# Patient Record
Sex: FEMALE | Race: WHITE | NOT HISPANIC OR LATINO | Employment: FULL TIME | URBAN - NONMETROPOLITAN AREA
[De-identification: names, ages, dates, MRNs, and addresses within clinical notes are randomized per-mention and may not be internally consistent; named-entity substitution may affect disease eponyms.]

---

## 2020-06-23 ENCOUNTER — OFFICE VISIT CONVERTED (OUTPATIENT)
Dept: FAMILY MEDICINE CLINIC | Age: 35
End: 2020-06-23
Attending: NURSE PRACTITIONER

## 2020-08-28 ENCOUNTER — CONVERSION ENCOUNTER (OUTPATIENT)
Dept: PLASTIC SURGERY | Facility: CLINIC | Age: 35
End: 2020-08-28

## 2020-08-28 ENCOUNTER — OFFICE VISIT CONVERTED (OUTPATIENT)
Dept: PLASTIC SURGERY | Facility: CLINIC | Age: 35
End: 2020-08-28
Attending: PLASTIC SURGERY

## 2020-09-21 ENCOUNTER — HOSPITAL ENCOUNTER (OUTPATIENT)
Dept: OTHER | Facility: HOSPITAL | Age: 35
Discharge: HOME | End: 2020-09-21
Attending: PLASTIC SURGERY

## 2020-10-23 ENCOUNTER — OFFICE VISIT CONVERTED (OUTPATIENT)
Dept: PLASTIC SURGERY | Facility: CLINIC | Age: 35
End: 2020-10-23
Attending: PLASTIC SURGERY

## 2020-10-23 ENCOUNTER — CONVERSION ENCOUNTER (OUTPATIENT)
Dept: PLASTIC SURGERY | Facility: CLINIC | Age: 35
End: 2020-10-23

## 2020-10-29 ENCOUNTER — HOSPITAL ENCOUNTER (OUTPATIENT)
Dept: PREADMISSION TESTING | Facility: HOSPITAL | Age: 35
Discharge: HOME | End: 2020-10-29
Attending: PLASTIC SURGERY

## 2020-10-31 LAB — SARS-COV-2 RNA SPEC QL NAA+PROBE: NOT DETECTED

## 2020-11-03 ENCOUNTER — PROCEDURE VISIT CONVERTED (OUTPATIENT)
Dept: OTHER | Facility: HOSPITAL | Age: 35
End: 2020-11-03
Attending: PLASTIC SURGERY

## 2020-11-03 ENCOUNTER — HOSPITAL ENCOUNTER (OUTPATIENT)
Dept: PERIOP | Facility: HOSPITAL | Age: 35
Setting detail: HOSPITAL OUTPATIENT SURGERY
Discharge: HOME | End: 2020-11-03
Attending: PLASTIC SURGERY

## 2020-11-06 ENCOUNTER — OFFICE VISIT CONVERTED (OUTPATIENT)
Dept: PLASTIC SURGERY | Facility: CLINIC | Age: 35
End: 2020-11-06
Attending: PLASTIC SURGERY

## 2020-11-06 ENCOUNTER — CONVERSION ENCOUNTER (OUTPATIENT)
Dept: PLASTIC SURGERY | Facility: CLINIC | Age: 35
End: 2020-11-06

## 2020-11-13 ENCOUNTER — OFFICE VISIT CONVERTED (OUTPATIENT)
Dept: PLASTIC SURGERY | Facility: CLINIC | Age: 35
End: 2020-11-13
Attending: PLASTIC SURGERY

## 2020-11-13 ENCOUNTER — CONVERSION ENCOUNTER (OUTPATIENT)
Dept: PLASTIC SURGERY | Facility: CLINIC | Age: 35
End: 2020-11-13

## 2020-11-30 ENCOUNTER — OFFICE VISIT CONVERTED (OUTPATIENT)
Dept: PLASTIC SURGERY | Facility: CLINIC | Age: 35
End: 2020-11-30
Attending: NURSE PRACTITIONER

## 2020-11-30 ENCOUNTER — CONVERSION ENCOUNTER (OUTPATIENT)
Dept: PLASTIC SURGERY | Facility: CLINIC | Age: 35
End: 2020-11-30

## 2020-12-18 ENCOUNTER — OFFICE VISIT CONVERTED (OUTPATIENT)
Dept: FAMILY MEDICINE CLINIC | Age: 35
End: 2020-12-18
Attending: NURSE PRACTITIONER

## 2020-12-18 ENCOUNTER — HOSPITAL ENCOUNTER (OUTPATIENT)
Dept: OTHER | Facility: HOSPITAL | Age: 35
Discharge: HOME | End: 2020-12-18
Attending: NURSE PRACTITIONER

## 2020-12-18 LAB
25(OH)D3 SERPL-MCNC: 21.7 NG/ML (ref 30–100)
ALBUMIN SERPL-MCNC: 4.1 G/DL (ref 3.5–5)
ALBUMIN/GLOB SERPL: 1.2 {RATIO} (ref 1.4–2.6)
ALP SERPL-CCNC: 99 U/L (ref 42–98)
ALT SERPL-CCNC: 17 U/L (ref 10–40)
ANION GAP SERPL CALC-SCNC: 14 MMOL/L (ref 8–19)
AST SERPL-CCNC: 21 U/L (ref 15–50)
BILIRUB SERPL-MCNC: 0.25 MG/DL (ref 0.2–1.3)
BUN SERPL-MCNC: 13 MG/DL (ref 5–25)
BUN/CREAT SERPL: 19 {RATIO} (ref 6–20)
CALCIUM SERPL-MCNC: 9.7 MG/DL (ref 8.7–10.4)
CHLORIDE SERPL-SCNC: 102 MMOL/L (ref 99–111)
CONV CO2: 25 MMOL/L (ref 22–32)
CONV TOTAL PROTEIN: 7.4 G/DL (ref 6.3–8.2)
CREAT UR-MCNC: 0.69 MG/DL (ref 0.5–0.9)
ERYTHROCYTE [DISTWIDTH] IN BLOOD BY AUTOMATED COUNT: 14.5 % (ref 11.5–14.5)
GFR SERPLBLD BASED ON 1.73 SQ M-ARVRAT: >60 ML/MIN/{1.73_M2}
GLOBULIN UR ELPH-MCNC: 3.3 G/DL (ref 2–3.5)
GLUCOSE SERPL-MCNC: 100 MG/DL (ref 65–99)
HBA1C MFR BLD: 12.1 G/DL (ref 12–16)
HCT VFR BLD AUTO: 38 % (ref 37–47)
MCH RBC QN AUTO: 24.8 PG (ref 27–31)
MCHC RBC AUTO-ENTMCNC: 31.8 G/DL (ref 33–37)
MCV RBC AUTO: 77.9 FL (ref 81–99)
OSMOLALITY SERPL CALC.SUM OF ELEC: 284 MOSM/KG (ref 273–304)
PLATELET # BLD AUTO: 240 10*3/UL (ref 130–400)
PMV BLD AUTO: 12.2 FL (ref 7.4–10.4)
POTASSIUM SERPL-SCNC: 4.1 MMOL/L (ref 3.5–5.3)
RBC # BLD AUTO: 4.88 10*6/UL (ref 4.2–5.4)
SODIUM SERPL-SCNC: 137 MMOL/L (ref 135–147)
TSH SERPL-ACNC: 0.8 M[IU]/L (ref 0.27–4.2)
WBC # BLD AUTO: 6.1 10*3/UL (ref 4.8–10.8)

## 2020-12-31 ENCOUNTER — OFFICE VISIT CONVERTED (OUTPATIENT)
Dept: PLASTIC SURGERY | Facility: CLINIC | Age: 35
End: 2020-12-31
Attending: PLASTIC SURGERY

## 2021-05-10 NOTE — H&P
"   History and Physical      Patient Name: Claire Vasquez   Patient ID: [id number]   Sex: Female   YOB: 1985        Visit Date: 2020    Provider: Amy Owens MD   Location: [facility]   Location Address: [facility]  [place]  [id number]   Location Phone: [phone]          Chief Complaint  · \"My breasts are too big\"  · \"I'm having shoulder and back pain\"  · \"My bra straps are cutting into my shoulders\"  · \"I have rashes under my breasts\"  · \"I can't exercise because of my breasts\"      History Of Present Illness  Claire Vasquez is a 34 year old /White female who presents to the office today as a consult from Josephine CHAVEZ.   Claire Vasquez is a 34 year old female who presents to the office today as a consult from REFERRING CARE PROVIDER NAME and would like to discuss breast reduction. Current bra size 38DDD , would like to be a full C. No personal history and/or no positive family (who) have a history of breast cancer. Neck, shoulders, and upper back pain present for 8 years. Conservative treatments of ibuprofen and weight loss , with little or temporary relief. Experiences rashes, treats with cortisone creme and Aquaphor. Trouble sleeping, cannot get comfortable. Trouble exercising.      Has 4 kids and had tubal ligation.  Is not breast feeding. PCP Josephine Dean recommended cortisone cremes and has tried Aquaphor. Healthy with no medical problems except restless leg syndrome.heals well. Works a medical assistant. Has never had a mammogram. Pt wants done at [facility]. Would like to have surgery before end of year. Has 5 month old, breast size stable,             Past Medical History  Acne; Anxiety; Macromastia         Past Surgical History   section; Dilatation and curettage         Medication List  Requip 0.25 mg oral tablet         Allergy List  NO KNOWN DRUG ALLERGIES         Family Medical History  Heart Disease " "        Social History  Tobacco (Never)         Review of Systems  · Cardiovascular  o Denies  o : chest pain, lower extremity edema, Heart Attack, Abnormal EKG  · Respiratory  o Denies  o : shortness of breath, wheezing, cough  · Neurologic  o Denies  o : muscular weakness, incoordination, tingling or numbness, headaches  · Psychiatric  o Denies  o : anxiety, depression, difficulty sleeping      Vitals  Date Time BP Position Site L\R Cuff Size HR RR TEMP (F) WT  HT  BMI kg/m2 BSA m2 O2 Sat        08/28/2020 11:16 /85 Sitting    81 - R  97.4 184lbs 0oz 5'  4\" 31.58 1.94 97 %          Physical Examination  · Constitutional  o Appearance  o : well developed, well-nourished, Alert and oriented X3  · Eyes  o Sclerae  o : sclerae white  · Respiratory  o Respiratory Effort  o : breathing unlabored   · Cardiovascular  o Heart  o : regular rate  · Breasts  o FEMALE BREAST EXAM  o :   § Masses  § : no masses  § Nipple Discharge  § : no nipple discharge  § Axillary Lymphadenopathy  § : no axillary lymphadenopathy  § SN-N (Right Breast)  § : 26  § SN-N (Left Breast)  § : 28  § SN-IMF (Right Breast)  § : 19  § SN-IMF (Left Breast)  § : 19  § N-IMF stretch (Right Breast)  § : 12  § N-IMF stretch (Left Breast)  § : 10  · Gastrointestinal  o Abdominal Examination  o : abdomen nontender to palpation  · Lymphatic  o Axilla  o : No lymphadenopathy present  · Skin and Subcutaneous Tissue  o General Inspection  o : no lesions present, no areas of discoloration, skin turgor normal, texture normal  · Psychiatric  o Judgement and Insight  o : judgment and insight intact  o Mood and Affect  o : mood normal, affect appropriate  · Schnur Scale  o Schnur Scale Score  o : 527  · BSA  o BSA  o : 1.94     Bilateral light red rash and hyperpigmentation under bilateral breast. Left breast is larger than right. Significant shoulder grooving, more on right.                Assessment  · Pre-operative " examination     V72.84/Z01.818  · Macromastia       Hypertrophy of breast     611.1/N62      Plan  · Orders  o Mammogram breast screening 2D digital bilateral. (22667, ) - 611.1/N62 - 08/28/2020   Pre-op for BR  o Plastics reconstructive visit (PSREC) - - 08/28/2020  · Medications  o Medications have been Reconciled  o Transition of Care or Provider Policy  · Instructions  o Surgical Facility: [facility]   o ****Surgical Orders****  o ****Patient Status****  o Outpatient  o ********************  o RISK AND BENEFITS:  o Consent for surgery: Given these options, the patient has verbally expressed an understanding of the risks of surgery and finds these risks acceptable. We will proceed with surgery as soon as possible.  o O.R. PREP: Per protocol  o IV: Per Anesthesia  o SCD's preoperatively  o Please sign permit for: bilateral reduction mammaplasty  o Please do not give any anethesia until patient's site has been marked.   o *******************************************  o PRE- OP MEDICATION ORDER:  o *******************************************  o Kefzol 2 grams IV on call to OR.  o Prior to procedure, patient had negative COVID verbal screening and signed COVID consent.  o The above History and Physical Examination has been completed within 30 days of admission.  o ********************  o The patient was given literature in regards to the procedure and encouraged to visit the websites of [employer] and [employer].  o We will have the patient send the report or undergo a pre-operative mammogram.  o Greater than 45 min of time was spent directly with the pt in counseling & coordination of care.  o We discussed the procedure for bilateral breast reduction under general anesthesia as well as the postoperative recovery time and the need for limitation of activities. The risks of surgery were discussed including bleeding, infection, scarring (for which diagrams were drawn), pain, poor healing, loss of skin and or  nipple, change in nipple sensation, inability to breast feed, asymmetry, no guarantee of postoperative cup size.  o I think that this patient is an excellent candidate for a breast reduction. I feel that this procedure is medically necessary to relieve her symptoms. I would anticipate resecting at least 527 grams of breast tissue from each breast. Photographs were taken. We will contact the insurance company for preauthorization. This patient has my office number to call with any further questions.  o Electronically Identified Patient Education Materials Provided Electronically            Electronically Signed by: Amy Owens MD -Author on August 28, 2020 01:54:12 PM

## 2021-05-10 NOTE — TELEPHONE ENCOUNTER
"   Quick Note      Patient Name: Claire Vasquez   Patient ID: [id number]   Sex: Female   YOB: 1985    Referring Provider: Josephine CHAVEZ    Visit Date: November 3, 2020    Provider: Amy Owens MD   Location: [facility]   Location Address: [address] [place] [address]  [id number]          History Of Present Illness  Phone Call Follow Up Following Surgery:  Phone Call Date: 11/04/2020   Phone call contact number: [phone]   Patient reached: Able to contact the patient on first attempt   Pain Control:    Pain Control: Pain is adequately controlled with current medications   Pain Rating (according to the Josselyn's pain scale): 6/10   Post Surgical Incision:    Surgical Dressing: No reddness, No swellling, and No drainage   Drain Output: N/A   Other Surgical Concerns:  Adequate Fluid Intake: Yes   Up walking every 2 hours: Yes   Fever: No   Nausea/Emesis: No   Any bowel or bladder concerns: No   Post Op Care instructions clear: Yes   Medication instructions clear: Yes   Any other concerns: Patient was hoarse from breathing tube, but overall felt good. Patient states she is supplementing with ibuprofen, doesn't like the way her pain medication \"makes her feel.\"                 Electronically Signed by: Zenia Pro MA -Author on November 4, 2020 01:55:20 PM  "

## 2021-05-13 NOTE — PROGRESS NOTES
Progress Note      Patient Name: Claire Vasquez   Patient ID: [id number]   Sex: Female   YOB: 1985    Referring Provider: Josephine CHAVEZ    Visit Date: 2020    Provider: Amy Owens MD   Location: [facility]   Location Address: [address] [place] [address]  [id number]   Location Phone: [phone]          Chief Complaint  · Follow Up Visit, Breast Reduction      History Of Present Illness  Claire Vasquez is a 34 year old /White female who presents to the office today as a consult from Josephine CHAVEZ.   Claire Vasquez is a 34 year old /White female who presents to the office today for a follow up visit status post breast reduction on 11/3/2020. She is doing very well.   Pain Rating on the Courtenay's Scale is: 4/10       Past Medical History  Acne; Anxiety; Macromastia         Past Surgical History  breast reduction;  section; Dilatation and curettage         Medication List  ibuprofen oral; Requip 0.25 mg oral tablet; Vitamin C 500 mg oral capsule, extended release; Vitamin D3 100 mcg (4,000 unit) oral capsule         Allergy List  NO KNOWN DRUG ALLERGIES         Family Medical History  Heart Disease         Social History  Tobacco (Never)         Review of Systems  · Cardiovascular  o Denies  o : chest pain, lower extremity edema, Heart Attack, Abnormal EKG  · Respiratory  o Denies  o : shortness of breath, wheezing, cough  · Neurologic  o Denies  o : muscular weakness, incoordination, tingling or numbness, headaches  · Psychiatric  o Denies  o : anxiety, depression, difficulty sleeping      Vitals  Date Time BP Position Site L\R Cuff Size HR RR TEMP (F) WT  HT  BMI kg/m2 BSA m2 O2 Sat FR L/min FiO2 HC       2020 10:43 /83 Sitting    79 - R  97.6     99 %  21%          Physical Examination  · Constitutional  o Appearance  o : well developed, well-nourished, Alert and oriented X3  · Respiratory  o Respiratory  Effort  o : breathing unlabored   · Cardiovascular  o Heart  o : regular rate  · Skin and Subcutaneous Tissue  o Surgical Incision  o : steri strips in place, breasts soft, good symmetry, expected ecchymosis and edema, nipples viable          Assessment  · Postoperative follow-up     V67.00/Z09  · Macromastia       Hypertrophy of breast     611.1/N62      Plan  · Medications  o Medications have been Reconciled  o Transition of Care or Provider Policy  · Instructions  o Ok to shower, continue bra, no heavy lifting, wean pain meds, follow up one week.            Electronically Signed by: Amy Owens MD -Author on November 6, 2020 11:07:13 AM

## 2021-05-13 NOTE — PROGRESS NOTES
Progress Note      Patient Name: Claire Vasquez   Patient ID: [id number]   Sex: Female   YOB: 1985    Referring Provider: Josephine CHAVEZ    Visit Date: 2020    Provider: Amy Owens MD   Location: [facility]   Location Address: [address] [place] [address]  [id number]   Location Phone: [phone]          History Of Present Illness  Claire Vasquez is a 34 year old /White female who presents to the office today as a consult from Josephine CHAVEZ.   Claire Vasquez is a 34 year old female who presents to the office today as a consult from REFERRING CARE PROVIDER NAME and would like to discuss breast reduction. Current bra size 38DDD , would like to be a full C. No personal history and/or no positive family (who) have a history of breast cancer. Neck, shoulders, and upper back pain present for 8 years. Conservative treatments of ibuprofen and weight loss , with little or temporary relief. Experiences rashes, treats with cortisone creme and Aquaphor. Trouble sleeping, cannot get comfortable. Trouble exercising.      Has 4 kids and had tubal ligation.  Is not breast feeding. PCP Josephine Dean recommended cortisone cremes and has tried Aquaphor. Healthy with no medical problems except restless leg syndrome.heals well. Works a medical assistant. mammogram normal, breast size stable,   Here today for preop           Past Medical History  Acne; Anxiety; Macromastia         Past Surgical History   section; Dilatation and curettage         Medication List  Requip 0.25 mg oral tablet; Vitamin C 500 mg oral capsule, extended release; Vitamin D3 100 mcg (4,000 unit) oral capsule         Allergy List  NO KNOWN DRUG ALLERGIES         Family Medical History  Heart Disease         Social History  Tobacco (Never)         Vitals  Date Time BP Position Site L\R Cuff Size HR RR TEMP (F) WT  HT  BMI kg/m2 BSA m2 O2 Sat FR L/min FiO2 HC        10/23/2020 11:51 /93 Sitting    86 - R  98     98 %  21%          Physical Examination  · Constitutional  o Appearance  o : well developed, well-nourished, Alert and oriented X3  · Eyes  o Sclerae  o : sclerae white  · Respiratory  o Respiratory Effort  o : breathing unlabored   · Cardiovascular  o Heart  o : regular rate  · Breasts  o FEMALE BREAST EXAM  o :   § Masses  § : no masses  § Nipple Discharge  § : no nipple discharge  § Axillary Lymphadenopathy  § : no axillary lymphadenopathy  § SN-N (Right Breast)  § : 26  § SN-N (Left Breast)  § : 28  § SN-IMF (Right Breast)  § : 19  § SN-IMF (Left Breast)  § : 19  § N-IMF stretch (Right Breast)  § : 12  § N-IMF stretch (Left Breast)  § : 10  · Gastrointestinal  o Abdominal Examination  o : abdomen nontender to palpation  · Lymphatic  o Axilla  o : No lymphadenopathy present  · Skin and Subcutaneous Tissue  o General Inspection  o : no lesions present, no areas of discoloration, skin turgor normal, texture normal  · Psychiatric  o Judgement and Insight  o : judgment and insight intact  o Mood and Affect  o : mood normal, affect appropriate  · Schnur Scale  o Schnur Scale Score  o : 527  · BSA  o BSA  o : 1.94     Bilateral light red rash and hyperpigmentation under bilateral breast. Left breast is larger than right. Significant shoulder grooving, more on right.                Assessment  · Pre-operative examination     V72.84/Z01.818  · Macromastia       Hypertrophy of breast     611.1/N62      Plan  · Orders  o [facility] Pre-Op Covid-19 Screening (97755) - - 10/26/2020  · Medications  o Medications have been Reconciled  o Transition of Care or Provider Policy  · Instructions  o Surgical Facility: [facility]   o ****Surgical Orders****  o ****Patient Status****  o Outpatient  o ********************  o RISK AND BENEFITS:  o Consent for surgery: Given these options, the patient has verbally expressed an understanding of the risks of surgery and finds  these risks acceptable. We will proceed with surgery as soon as possible.  o O.R. PREP: Per protocol  o IV: Per Anesthesia  o SCD's preoperatively  o Please sign permit for: bilateral reduction mammaplasty  o Please do not give any anethesia until patient's site has been marked.   o *******************************************  o PRE- OP MEDICATION ORDER:  o *******************************************  o Kefzol 2 grams IV on call to OR.  o Prior to procedure, patient had negative COVID verbal screening and signed COVID consent.  o The above History and Physical Examination has been completed within 30 days of admission.  o ********************  o We discussed the procedure for bilateral breast reduction under general anesthesia as well as the postoperative recovery time and the need for limitation of activities. The risks of surgery were discussed including bleeding, infection, scarring (for which diagrams were drawn), pain, poor healing, loss of skin and or nipple, change in nipple sensation, inability to breast feed, asymmetry, no guarantee of postoperative cup size.  o I think that this patient is an excellent candidate for a breast reduction. I feel that this procedure is medically necessary to relieve her symptoms. I would anticipate resecting at least 527 grams of breast tissue from each breast. Photographs were taken. We will contact the insurance company for preauthorization. This patient has my office number to call with any further questions.            Electronically Signed by: Amy Owens MD -Author on October 26, 2020 11:26:55 AM

## 2021-05-13 NOTE — PROGRESS NOTES
Progress Note      Patient Name: Claire Vasquez   Patient ID: [id number]   Sex: Female   YOB: 1985    Referring Provider: Josephine CHAVEZ    Visit Date: 2020    Provider: KATHY Landaverde   Location: [facility]   Location Address: [address] [place] [address]  [id number]   Location Phone: [phone]          History Of Present Illness  Claire Vasquez is a 34 year old /White female who presents to the office today as a consult from Josephine CHAVEZ.   Claire Vasquez is a 34 year old /White female who presents to the office today for a follow up visit status post breast reduction on 11/3/2020. Patient states a burning sensation underneath breasts. Patient states left breast is worse. Has small raw open area over left medial breast incision, no pus. Was apply Aquaphor and strips fell off. She is doing very well.   Pain Rating on the Farmingdale's Scale is: none       Past Medical History  Acne; Anxiety; Macromastia         Past Surgical History  breast reduction;  section; Dilatation and curettage         Medication List  ibuprofen oral; Requip 0.25 mg oral tablet; Vitamin C 500 mg oral capsule, extended release; Vitamin D3 100 mcg (4,000 unit) oral capsule         Allergy List  NO KNOWN DRUG ALLERGIES         Family Medical History  Heart Disease         Social History  Tobacco (Never)         Vitals  Date Time BP Position Site L\R Cuff Size HR RR TEMP (F) WT  HT  BMI kg/m2 BSA m2 O2 Sat FR L/min FiO2 HC       2020 11:53 /84 Sitting    80 - R  97.8     99 %  21%          Physical Examination  · Constitutional  o Appearance  o : well developed, well-nourished, Alert and oriented X3  · Respiratory  o Respiratory Effort  o : breathing unlabored   · Cardiovascular  o Heart  o : regular rate  · Skin and Subcutaneous Tissue  o Surgical Incision  o : breasts soft, good symmetry, resolving edema but left breast slightly more  than right, nipples viable, 1 cm superficial raw area left Tjuction. left superior nipple small 3mm raw area          Assessment  · Postoperative follow-up     V67.00/Z09  · Macromastia       Hypertrophy of breast     611.1/N62      Plan  · Orders  o Plastics reconstructive visit (PSREC) - - 11/30/2020  · Medications  o Medications have been Reconciled  o Transition of Care or Provider Policy  · Instructions  o Removed strips, continue bra, no heavy lifting, bacitracin ointment to raw open areas, Aquaphor and scar massage over healed areas, rtc 2 weeks  o Electronically Identified Patient Education Materials Provided Electronically            Electronically Signed by: KATHY Landaverde -Author on November 30, 2020 03:07:59 PM

## 2021-05-13 NOTE — PROGRESS NOTES
Progress Note      Patient Name: Claire Vasquez   Patient ID: [id number]   Sex: Female   YOB: 1985    Referring Provider: Josephine CHAVEZ    Visit Date: 2020    Provider: Amy Owens MD   Location: [facility]   Location Address: [address] [place] [address]  [id number]   Location Phone: [phone]          History Of Present Illness  Claire Vasquez is a 34 year old /White female who presents to the office today as a consult from Josephine CHAVEZ.   Claire Vasquez is a 34 year old /White female who presents to the office today for a follow up visit status post breast reduction on 11/3/2020. Patient states a burning sensation underneath breasts. Patient states left breast is worse. She is doing very well.   Pain Rating on the Josselyn's Scale is: 2/10       Past Medical History  Acne; Anxiety; Macromastia         Past Surgical History  breast reduction;  section; Dilatation and curettage         Medication List  ibuprofen oral; Requip 0.25 mg oral tablet; Vitamin C 500 mg oral capsule, extended release; Vitamin D3 100 mcg (4,000 unit) oral capsule         Allergy List  NO KNOWN DRUG ALLERGIES         Family Medical History  Heart Disease         Social History  Tobacco (Never)         Vitals  Date Time BP Position Site L\R Cuff Size HR RR TEMP (F) WT  HT  BMI kg/m2 BSA m2 O2 Sat FR L/min FiO2 HC       2020 10:25 /83 Sitting    84 - R  97.8     98 %  21%          Physical Examination  · Constitutional  o Appearance  o : well developed, well-nourished, Alert and oriented X3  · Respiratory  o Respiratory Effort  o : breathing unlabored   · Cardiovascular  o Heart  o : regular rate  · Skin and Subcutaneous Tissue  o Surgical Incision  o : steri strips in place, breasts soft, good symmetry, improving ecchymosis and edema, nipples viable          Assessment  · Postoperative  follow-up     V67.00/Z09  · Macromastia       Hypertrophy of breast     611.1/N62      Plan  · Medications  o Medications have been Reconciled  o Transition of Care or Provider Policy  · Instructions  o Ok to shower, continue bra, no heavy lifting, rtc 1 month            Electronically Signed by: Amy Owens MD -Author on November 13, 2020 12:23:21 PM

## 2021-05-14 VITALS
HEART RATE: 75 BPM | OXYGEN SATURATION: 96 % | TEMPERATURE: 97.7 F | SYSTOLIC BLOOD PRESSURE: 117 MMHG | DIASTOLIC BLOOD PRESSURE: 82 MMHG

## 2021-05-14 VITALS
HEART RATE: 80 BPM | TEMPERATURE: 97.8 F | SYSTOLIC BLOOD PRESSURE: 134 MMHG | OXYGEN SATURATION: 99 % | DIASTOLIC BLOOD PRESSURE: 84 MMHG

## 2021-05-14 VITALS
SYSTOLIC BLOOD PRESSURE: 125 MMHG | DIASTOLIC BLOOD PRESSURE: 85 MMHG | OXYGEN SATURATION: 97 % | TEMPERATURE: 97.4 F | HEIGHT: 64 IN | BODY MASS INDEX: 31.41 KG/M2 | HEART RATE: 81 BPM | WEIGHT: 184 LBS

## 2021-05-14 VITALS
SYSTOLIC BLOOD PRESSURE: 118 MMHG | TEMPERATURE: 97.8 F | DIASTOLIC BLOOD PRESSURE: 83 MMHG | OXYGEN SATURATION: 98 % | HEART RATE: 84 BPM

## 2021-05-14 VITALS
DIASTOLIC BLOOD PRESSURE: 93 MMHG | OXYGEN SATURATION: 98 % | TEMPERATURE: 98 F | SYSTOLIC BLOOD PRESSURE: 135 MMHG | HEART RATE: 86 BPM

## 2021-05-14 VITALS
TEMPERATURE: 97.6 F | SYSTOLIC BLOOD PRESSURE: 121 MMHG | DIASTOLIC BLOOD PRESSURE: 83 MMHG | OXYGEN SATURATION: 99 % | HEART RATE: 79 BPM

## 2021-05-17 ENCOUNTER — HOSPITAL ENCOUNTER (OUTPATIENT)
Dept: OTHER | Facility: HOSPITAL | Age: 36
Discharge: HOME | End: 2021-05-17
Attending: PLASTIC SURGERY

## 2021-05-18 NOTE — PROGRESS NOTES
Claire Vasquez  1985     Office/Outpatient Visit    Visit Date: Fri, Dec 18, 2020 08:36 am    Provider: Josephine Dean N.P. (Assistant: Niru Murry MA)    Location: [facility]        Electronically signed by Josephine Dean N.P. on  2020 12:38:28 PM                             Subjective:        CC: Mrs. Vasquez is a 35 year old White female.  Patient presents today for six month follow up;         HPI:       lmp current.  regular menses but heavy flow consistent finding for many yrs.    improved on ropinorole 0.5 mg q hs.  does still experience  leg pain at time even during the day.  denies injury, edema, or an other concerns.  denies side effects.  requests refills.      ROS:     CONSTITUTIONAL:  Positive for fatigue ( mild ).   Negative for chills or fever.      CARDIOVASCULAR:  Negative for chest pain, palpitations, tachycardia, orthopnea, and edema.      RESPIRATORY:  Negative for cough, dyspnea, and hemoptysis.      GASTROINTESTINAL:  Negative for abdominal pain, heartburn, constipation, diarrhea, and stool changes.      MUSCULOSKELETAL:  Positive for limb pain ( bilateral leg pain ).      NEUROLOGICAL:  Negative for dizziness, headaches, paresthesias, and weakness.      PSYCHIATRIC:  Negative for anxiety, depression, and sleep disturbances.          Past Medical History / Family History / Social History:         Last Reviewed on 2020 08:43 AM by Josephine Dean    Past Medical History:                 PAST MEDICAL HISTORY             CURRENT MEDICAL PROVIDERS:    Obstetrician/Gynecologist: [place]    plastic surgeon dr madrid         PREVENTIVE HEALTH MAINTENANCE             PAP SMEAR: was last done  with normal results         Surgical History:          section: X four;     Bilateral Tubal Ligation: ;     wisdom teeth extraction;    breast reduction oct 2020;         Family History:         Mother: coronary artery disease         Social History:      Occupation: [employer]     Marital Status:      Children: 4 children         Tobacco/Alcohol/Supplements:     Last Reviewed on 12/18/2020 08:38 AM by Niru Murry    Tobacco: She has never smoked.          Current Problems:     Last Reviewed on 12/18/2020 08:43 AM by Josephine Dean    Restless legs syndrome    Encounter for screening for depression    Pain in right leg    Pain in left leg    Encounter for screening for diseases of the blood and blood-forming organs and certain disorders involving the immune mechanism    Encounter for screening for diabetes mellitus    Encounter for screening for other suspected endocrine disorder        Immunizations:     None        Allergies:     Last Reviewed on 12/18/2020 08:38 AM by Niru Murry    No Known Allergies.        Current Medications:     Last Reviewed on 12/18/2020 08:38 AM by Niru Murry    rOPINIRole 0.5 mg oral tablet [take 1 tablet (0.5 mg) by oral route 1-3 hours before bedtime]        Objective:        Vitals:         Historical:     6/23/2020  BP:   107/51 mm Hg ( (right arm, , sitting, );) 6/23/2020  Wt:   191lbs    Current: 12/18/2020 8:39:01 AM    Ht:  5 ft, 3 in;  Wt: 183.4 lbs;  BMI: 32.5T: 97.1 F (temporal);  BP: 128/84 mm Hg (right arm, sitting);  P: 88 bpm (right arm (BP Cuff), sitting)        Exams:     PHYSICAL EXAM:     GENERAL:  well developed and nourished; appropriately groomed; in no apparent distress;     NECK: thyroid is non-palpable;     RESPIRATORY: normal respiratory rate and pattern with no distress; normal breath sounds with no rales, rhonchi, wheezes or rubs;     CARDIOVASCULAR: normal rate; rhythm is regular;  no edema;     MUSCULOSKELETAL:  Normal range of motion, strength and tone;     NEUROLOGIC: mental status: alert and oriented x 3; GROSSLY INTACT     PSYCHIATRIC:  appropriate affect and demeanor; normal speech pattern; grossly normal memory;         Assessment:         G25.81   Restless legs  syndrome       M79.604   Pain in right leg       M79.605   Pain in left leg       Z13.29   Encounter for screening for other suspected endocrine disorder       Z13.0   Encounter for screening for diseases of the blood and blood-forming organs and certain disorders involving the immune mechanism       Z13.1   Encounter for screening for diabetes mellitus           ORDERS:         Meds Prescribed:       [New Rx] rOPINIRole 0.5 mg oral tablet [take 1or 2 tablets  by oral route 1-3 hours before bedtime], #90 (ninety) tablets, Refills: 2 (two)         Lab Orders:       09581  VITD - [facility] Vitamin D, 25 Hydroxy  (Send-Out)            02385  TSH - [facility] TSH  (Send-Out)            52362  BDCB2 - [facility] CBC w/o diff  (Send-Out)            84571  COMP - [facility] Comp. Metabolic Panel  (Send-Out)            APPTO  Appointment need  (In-House)                      Plan:         Restless legs syndrome    MIPS Vaccines Flu and Pneumonia updated in Shot record     FOLLOW-UP: Schedule a follow-up visit in 6 months.:.            Prescriptions:       [New Rx] rOPINIRole 0.5 mg oral tablet [take 1or 2 tablets  by oral route 1-3 hours before bedtime], #90 (ninety) tablets, Refills: 2 (two)           Orders:       APPTO  Appointment need  (In-House)              Pain in right leg    LABORATORY:  Labs ordered to be performed today include Vitamin D.            Orders:       55709  VITD - [facility] Vitamin D, 25 Hydroxy  (Send-Out)              Pain in left legsee HPI above        Encounter for screening for other suspected endocrine disorder    LABORATORY:  Labs ordered to be performed today include TSH.            Orders:       62934  TSH - [facility] TSH  (Send-Out)              Encounter for screening for diseases of the blood and blood-forming organs and certain disorders involving the immune mechanism    LABORATORY:  Labs ordered to be performed today include CBC W/O DIFF.            Orders:       47448  BDCB2 - [facility] CBC w/o diff  (Send-Out)               Encounter for screening for diabetes mellitus    LABORATORY:  Labs ordered to be performed today include Comprehensive metabolic panel.            Orders:       [id number]  COMP - [facility] Comp. Metabolic Panel  (Send-Out)                  Patient Recommendations:        For  Restless legs syndrome:    Schedule a follow-up visit in 6 months.                APPOINTMENT INFORMATION:        Monday Tuesday Wednesday Thursday Friday Saturday Sunday            Time:___________________AM  PM   Date:_____________________             Charge Capture:         Primary Diagnosis:     G25.81  Restless legs syndrome           Orders:      22465  Office/outpatient visit; established patient, level 3  (In-House)            APPTO  Appointment need  (In-House)              M79.604  Pain in right leg     M79.605  Pain in left leg     Z13.29  Encounter for screening for other suspected endocrine disorder     Z13.0  Encounter for screening for diseases of the blood and blood-forming organs and certain disorders involving the immune mechanism     Z13.1  Encounter for screening for diabetes mellitus

## 2021-05-18 NOTE — PROGRESS NOTES
Claire Vasquez  1985     Office/Outpatient Visit    Visit Date: Tue, Jun 23, 2020 08:35 am    Provider: Josephine Dean N.P. (Assistant: Karen Cortez MA)    Location: [facility]        Electronically signed by Josephine Dean N.P. on  06/23/2020 09:52:31 PM                             Subjective:        CC: Mrs. Vasquez is a 34 year old female.  This is her first visit to the clinic.  establish care, wants a referral to get a breast reduction;         HPI: lmp june 1          PHQ-9 Depression Screening: Completed form scanned and in chart; Total Score 0           Pain in thoracic spine details; other details: denies injury.  concerns that breast size 38 DDD is contributing to chronic upper to mid  back and bilateral shoulder pain.  4th child born end of march.  breast size has increased with each child and would like to be evaluated for breast reduction.  requests referral to dr madrid.  has bilateral shoulder and back pain after walking and is trying to lose weight with diet and exercise..        see HPI above.  no injury.      see HPI above.  no injury.          present for several yrs or more.  restless legs and cramping pain like legs need to be stretched mostly at night but occasionally during daytime.  states that labs have been recently done with birth of child.  denies hx of anemia, low magnesium, low potassium, or vitamin deficiency.  symptoms have mostly resolved in the past with use of requip.  requests rx for requip.  stays well hydrated.      ROS:     CONSTITUTIONAL:  Negative for chills, fatigue, fever, and weight change.      CARDIOVASCULAR:  Negative for chest pain, palpitations, tachycardia, orthopnea, and edema.      RESPIRATORY:  Negative for cough, dyspnea, and hemoptysis.      MUSCULOSKELETAL:  Positive for arthralgias, back pain ( chronic; recurrent ) and myalgias.   Negative for joint stiffness or limb pain.      NEUROLOGICAL:  Negative for dizziness, headaches,  paresthesias, and weakness.      PSYCHIATRIC:  Negative for anxiety, depression, and sleep disturbances.          Past Medical History / Family History / Social History:         Last Reviewed on 2020 08:58 AM by Josephine Dean    Surgical History:          section: X four;     Bilateral Tubal Ligation: ;     wisdom teeth extraction;         Family History:         Mother: coronary artery disease         Social History:     Occupation: [employer]     Marital Status:      Children: 4 children         Tobacco/Alcohol/Supplements:     Last Reviewed on 2020 08:40 AM by Karen Cortez    Tobacco: She has never smoked.          Current Problems:     Restless legs syndrome    Pain in right shoulder    Pain in left shoulder    Pain in thoracic spine    Encounter for screening for depression        Immunizations:     None        Allergies:     Last Reviewed on 2020 08:40 AM by Karen Cortez    No Known Allergies.        Current Medications:     Last Reviewed on 2020 08:41 AM by Karen Cortez    No Known Medications.        Objective:        Vitals:         Current: 2020 8:43:18 AM    Ht:  5 ft, 3 in (Estimated);  Wt: 191 lbs;  BMI: 33.8T: 96.9 F (temporal);  BP: 107/51 mm Hg (right arm, sitting);  P: 87 bpm (right arm (BP Cuff), sitting)        Exams:     PHYSICAL EXAM:     GENERAL: obese;  no apparent distress;     NECK: thyroid is non-palpable;     RESPIRATORY: normal respiratory rate and pattern with no distress; normal breath sounds with no rales, rhonchi, wheezes or rubs;     CARDIOVASCULAR: normal rate; rhythm is regular;     Peripheral Pulses: posterior tibial: 2+ amplitude, no bruits; no edema;     MUSCULOSKELETAL: pain with range of motion in: back flexion;     NEUROLOGIC: mental status: alert and oriented x 3; GROSSLY INTACT     PSYCHIATRIC:  appropriate affect and demeanor; normal speech pattern; grossly normal memory;         Assessment:          Z13.31   Encounter for screening for depression       M54.6   Pain in thoracic spine       M25.512   Pain in left shoulder       M25.511   Pain in right shoulder       G25.81   Restless legs syndrome           ORDERS:         Meds Prescribed:       [New Rx] Requip 0.25 mg oral tablet [take 1 tablet (0.25 mg) by oral route 1-3 hours before bedtime], #90 (ninety) tablets, Refills: 1 (one)         Procedures Ordered:       REFER  Referral to Specialist or Other Facility  (Send-Out)              Other Orders:         Depression screen negative  (In-House)                      Plan:         Encounter for screening for depression    MIPS PHQ-9 Depression Screening: Completed form scanned and in chart; Total Score 0; Negative Depression Screen           Orders:         Depression screen negative  (In-House)              Pain in thoracic spine        REFERRALS:  Referral initiated to dr madrid plastic surgeon for evaluation for breast reduction due to chronic back and shoulder pain.            Orders:       REFER  Referral to Specialist or Other Facility  (Send-Out)              Restless legs syndrome          Prescriptions:       [New Rx] Requip 0.25 mg oral tablet [take 1 tablet (0.25 mg) by oral route 1-3 hours before bedtime], #90 (ninety) tablets, Refills: 1 (one)             Patient Recommendations:        For  Pain in thoracic spine:    I also recommend dr madrid plastic surgeon for evaluation for breast reduction due to chronic back and shoulder pain.              Charge Capture:         Primary Diagnosis:     Z13.31  Encounter for screening for depression           Orders:      54876  Office visit - new pt, level 2  (In-House)              Depression screen negative  (In-House)              M54.6  Pain in thoracic spine     M25.512  Pain in left shoulder     M25.511  Pain in right shoulder     G25.81  Restless legs syndrome         ADDENDUMS:      ____________________________________    Addendum:  06/28/2020 08:29 PM - Josephine Dean        remove [id number]    add [id number]. lj

## 2021-06-23 DIAGNOSIS — R92.8 ABNORMAL MAMMOGRAM: ICD-10-CM

## 2021-06-23 DIAGNOSIS — N63.20 LEFT BREAST MASS: Primary | ICD-10-CM

## 2021-07-02 VITALS
TEMPERATURE: 97.1 F | HEIGHT: 63 IN | HEART RATE: 88 BPM | DIASTOLIC BLOOD PRESSURE: 84 MMHG | WEIGHT: 183.4 LBS | BODY MASS INDEX: 32.5 KG/M2 | SYSTOLIC BLOOD PRESSURE: 128 MMHG

## 2021-07-02 VITALS
HEART RATE: 87 BPM | TEMPERATURE: 96.9 F | SYSTOLIC BLOOD PRESSURE: 107 MMHG | DIASTOLIC BLOOD PRESSURE: 51 MMHG | WEIGHT: 191 LBS | BODY MASS INDEX: 33.84 KG/M2 | HEIGHT: 63 IN

## 2021-07-14 ENCOUNTER — HOSPITAL ENCOUNTER (OUTPATIENT)
Dept: ULTRASOUND IMAGING | Facility: HOSPITAL | Age: 36
Discharge: HOME | End: 2021-07-14

## 2021-07-14 ENCOUNTER — HOSPITAL ENCOUNTER (OUTPATIENT)
Dept: MAMMOGRAPHY | Facility: HOSPITAL | Age: 36
Discharge: HOME | End: 2021-07-14

## 2021-07-14 ENCOUNTER — TELEPHONE (OUTPATIENT)
Dept: PLASTIC SURGERY | Facility: CLINIC | Age: 36
End: 2021-07-14

## 2021-07-14 DIAGNOSIS — N63.20 LEFT BREAST MASS: ICD-10-CM

## 2021-07-14 DIAGNOSIS — R92.8 ABNORMAL MAMMOGRAM: ICD-10-CM

## 2021-07-14 PROCEDURE — 77061 BREAST TOMOSYNTHESIS UNI: CPT | Performed by: RADIOLOGY

## 2021-07-14 PROCEDURE — 77065 DX MAMMO INCL CAD UNI: CPT | Performed by: RADIOLOGY

## 2021-07-14 PROCEDURE — G0279 TOMOSYNTHESIS, MAMMO: HCPCS

## 2021-07-14 PROCEDURE — 77065 DX MAMMO INCL CAD UNI: CPT

## 2021-07-14 NOTE — TELEPHONE ENCOUNTER
PATIENT HEARD 'S LEAVING. WANTS TO SEE BEFORE SHE LEAVES RECENTLY HAD MAMMO DONE AND DISCUSS RECOMMENDATIONS FOR SHERMAN/TT

## 2021-07-19 ENCOUNTER — APPOINTMENT (OUTPATIENT)
Dept: ULTRASOUND IMAGING | Facility: HOSPITAL | Age: 36
End: 2021-07-19

## 2021-07-19 ENCOUNTER — APPOINTMENT (OUTPATIENT)
Dept: MAMMOGRAPHY | Facility: HOSPITAL | Age: 36
End: 2021-07-19

## 2021-07-29 ENCOUNTER — OFFICE VISIT (OUTPATIENT)
Dept: PLASTIC SURGERY | Facility: CLINIC | Age: 36
End: 2021-07-29

## 2021-07-29 VITALS
HEART RATE: 86 BPM | TEMPERATURE: 98.6 F | OXYGEN SATURATION: 96 % | SYSTOLIC BLOOD PRESSURE: 127 MMHG | DIASTOLIC BLOOD PRESSURE: 83 MMHG

## 2021-07-29 DIAGNOSIS — N62 MACROMASTIA: Primary | ICD-10-CM

## 2021-07-29 PROCEDURE — 99212 OFFICE O/P EST SF 10 MIN: CPT | Performed by: PLASTIC SURGERY

## 2021-07-29 NOTE — PROGRESS NOTES
Follow-up (bbr)    Subjective            History of Present Illness  Claire Vasquez is a 35 y.o. female who presents to [facility] as Postoperative Follow-Up from bilateral breast reduction 11/3/2020. Mammogram showed some architectural distortion and recommended repeat imaging    Allergies: Patient has no known allergies.  Allergies Reconciled.    Review of Systems     Objective     /83 (BP Location: Left arm, Patient Position: Sitting, Cuff Size: Large Adult)   Pulse 86   Temp 98.6 °F (37 °C) (Temporal)   SpO2 96%     There is no height or weight on file to calculate BMI.    Physical Exam    Cardiovascular: Normal rate    Pulmonary/Chest: Effort normal   breasts soft and symmetric, scars well healed    Result Review :                Assessment and Plan      Diagnoses and all orders for this visit:    1. Macromastia (Primary)        Plan:  Obtain repeat mammogram, continue Aquaphor and scar massage, may wear underwire bra if tolerated, follow up after mammogram    Follow Up     No follow-ups on file.    Patient was given instructions and counseling regarding her condition. Please see specific information pulled into the AVS if appropriate.     Amy Owens MD  07/29/2021

## 2021-07-29 NOTE — PROGRESS NOTES
Chief Complaint  Follow-up (bbr)    Subjective     {Problem List  Visit Diagnosis   Encounters  Notes  Medications  Labs  Result Review Imaging  Media :23}     History of Present Illness  Claire Vasquez is a 35 y.o. female who presents to [facility] for Postoperative Follow-Up of ***.    Allergies: Patient has no known allergies.  Allergies Reconciled.    Review of Systems     Objective     There were no vitals taken for this visit.    There is no height or weight on file to calculate BMI.    Physical Exam  General Inspection: Incision healing well, no swelling, no erythema, no drainage.    Result Review :{Labs  Result Review  Imaging  Med Tab  Media :23}   {The following data was reviewed by (Optional):[id number]}             Assessment and Plan {CC Problem List  Visit Diagnosis  ROS  Review (Popup)  [facility] Maintenance  Quality  BestPractice  Medications  SmartSets  SnapShot Encounters  Media :23}     There are no diagnoses linked to this encounter.    Plan:  • ***    {Time Spent (Optional):82051}    Follow Up {Instructions Charge Capture  Follow-up Communications :23}    No follow-ups on file.    Patient was given instructions and counseling regarding her condition. Please see specific information pulled into the AVS if appropriate.     Fanny Murry MA  07/29/2021

## 2021-08-25 RX ORDER — ROPINIROLE 0.5 MG/1
1 TABLET, FILM COATED ORAL NIGHTLY
Qty: 60 TABLET | Refills: 1 | Status: SHIPPED | OUTPATIENT
Start: 2021-08-25 | End: 2021-11-04 | Stop reason: DRUGHIGH

## 2021-08-25 RX ORDER — ROPINIROLE 0.5 MG/1
2 TABLET, FILM COATED ORAL NIGHTLY
COMMUNITY
Start: 2021-08-25 | End: 2021-08-25 | Stop reason: SDUPTHER

## 2021-08-25 NOTE — TELEPHONE ENCOUNTER
Caller: Claire Vasquez    Relationship: Self    Best call back number: [phone]     Medication needed:   REQUIP     When do you need the refill by: ASAP    What additional details did the patient provide when requesting the medication: PT STATED SHE TAKES IT TWICE AT NIGHT    Does the patient have less than a 3 day supply:  [x] Yes  [] No    What is the patient's preferred [facility] [address] [phone] [facility] [phone]

## 2021-11-04 ENCOUNTER — OFFICE VISIT (OUTPATIENT)
Dept: FAMILY MEDICINE CLINIC | Age: 36
End: 2021-11-04

## 2021-11-04 ENCOUNTER — LAB (OUTPATIENT)
Dept: LAB | Facility: HOSPITAL | Age: 36
End: 2021-11-04

## 2021-11-04 VITALS
WEIGHT: 186 LBS | TEMPERATURE: 97.5 F | HEIGHT: 63 IN | OXYGEN SATURATION: 100 % | SYSTOLIC BLOOD PRESSURE: 116 MMHG | HEART RATE: 73 BPM | BODY MASS INDEX: 32.96 KG/M2 | DIASTOLIC BLOOD PRESSURE: 75 MMHG

## 2021-11-04 DIAGNOSIS — E55.9 VITAMIN D DEFICIENCY: ICD-10-CM

## 2021-11-04 DIAGNOSIS — Z13.220 SCREENING CHOLESTEROL LEVEL: ICD-10-CM

## 2021-11-04 DIAGNOSIS — E66.09 CLASS 1 OBESITY DUE TO EXCESS CALORIES WITHOUT SERIOUS COMORBIDITY WITH BODY MASS INDEX (BMI) OF 32.0 TO 32.9 IN ADULT: ICD-10-CM

## 2021-11-04 DIAGNOSIS — G25.81 RESTLESS LEG SYNDROME: ICD-10-CM

## 2021-11-04 DIAGNOSIS — G25.81 RESTLESS LEG SYNDROME: Primary | ICD-10-CM

## 2021-11-04 PROBLEM — E66.811 CLASS 1 OBESITY DUE TO EXCESS CALORIES WITHOUT SERIOUS COMORBIDITY WITH BODY MASS INDEX (BMI) OF 32.0 TO 32.9 IN ADULT: Status: ACTIVE | Noted: 2021-11-04

## 2021-11-04 PROBLEM — F41.9 ANXIETY: Status: ACTIVE | Noted: 2021-11-04

## 2021-11-04 LAB
25(OH)D3 SERPL-MCNC: 29.1 NG/ML
CHOLEST SERPL-MCNC: 204 MG/DL (ref 0–200)
HBA1C MFR BLD: 5.5 % (ref 4.8–5.6)
HDLC SERPL-MCNC: 35 MG/DL (ref 40–60)
LDLC SERPL CALC-MCNC: 129 MG/DL (ref 0–100)
LDLC/HDLC SERPL: 3.55 {RATIO}
MAGNESIUM SERPL-MCNC: 2.2 MG/DL (ref 1.6–2.6)
TRIGL SERPL-MCNC: 224 MG/DL (ref 0–150)
VLDLC SERPL-MCNC: 40 MG/DL (ref 5–40)

## 2021-11-04 PROCEDURE — 83527 ASSAY OF INSULIN: CPT

## 2021-11-04 PROCEDURE — 83036 HEMOGLOBIN GLYCOSYLATED A1C: CPT

## 2021-11-04 PROCEDURE — 83525 ASSAY OF INSULIN: CPT

## 2021-11-04 PROCEDURE — 82306 VITAMIN D 25 HYDROXY: CPT

## 2021-11-04 PROCEDURE — 83735 ASSAY OF MAGNESIUM: CPT

## 2021-11-04 PROCEDURE — 80061 LIPID PANEL: CPT | Performed by: NURSE PRACTITIONER

## 2021-11-04 PROCEDURE — 99214 OFFICE O/P EST MOD 30 MIN: CPT | Performed by: NURSE PRACTITIONER

## 2021-11-04 PROCEDURE — 36415 COLL VENOUS BLD VENIPUNCTURE: CPT | Performed by: NURSE PRACTITIONER

## 2021-11-04 RX ORDER — ROPINIROLE 1 MG/1
TABLET, FILM COATED ORAL
Qty: 135 TABLET | Refills: 2 | Status: SHIPPED | OUTPATIENT
Start: 2021-11-04 | End: 2022-07-22 | Stop reason: SDUPTHER

## 2021-11-04 NOTE — PROGRESS NOTES
"Chief Complaint  Restless Legs Syndrome (follow up - med refill )    Subjective  Latonia is a 35-year-old female here today for follow-up on restless leg syndrome.  Some improvement with the higher dose of ropinirole at bedtime.  Does not completely resolve her symptoms.  States her mother also has less restless leg syndrome.  She would also like some recommendations on how to reduce her body weight.  History of vitamin D deficiency not currently on a vitamin D supplement.  Has regular menstrual cycles and no chance of pregnancy.        Claire Vasquez presents to [facility]    Review of Systems   Constitutional: Negative.    Respiratory: Negative.    Cardiovascular: Negative.    Musculoskeletal:        Restless leg syndrome and intermittent cramping of both lower legs equally   Neurological: Negative.    Psychiatric/Behavioral: Negative.         Objective   Vital Signs:   Vitals:    11/04/21 1649   BP: 116/75   BP Location: Left arm   Patient Position: Sitting   Cuff Size: Large Adult   Pulse: 73   Temp: 97.5 °F (36.4 °C)   TempSrc: Oral   SpO2: 100%   Weight: 84.4 kg (186 lb)   Height: 160 cm (63\")      Physical Exam  Vitals reviewed.   Constitutional:       General: She is not in acute distress.     Appearance: Normal appearance. She is well-developed.   Cardiovascular:      Rate and Rhythm: Normal rate and regular rhythm.      Pulses:           Posterior tibial pulses are 2+ on the right side and 2+ on the left side.      Heart sounds: Normal heart sounds.   Pulmonary:      Effort: Pulmonary effort is normal.      Breath sounds: Normal breath sounds.   Musculoskeletal:      Right lower leg: No edema.      Left lower leg: No edema.   Skin:     General: Skin is warm and dry.   Neurological:      General: No focal deficit present.      Mental Status: She is alert.   Psychiatric:         Attention and Perception: Attention normal.         Mood and Affect: Mood and affect normal.         " Behavior: Behavior normal.          Result Review :                Assessment and Plan    Diagnoses and all orders for this visit:    1. Restless leg syndrome (Primary)  Assessment & Plan:  We will increase ropinirole dosage slightly.  She states she does not feel as if she would need the higher dose on some nights.  Does not want to try a different medication at this time.    Orders:  -     Magnesium; Future  -     rOPINIRole (Requip) 1 MG tablet; Take one and one half tablet 2 hours before bedtime  Dispense: 135 tablet; Refill: 2    2. Vitamin D deficiency  -     Vitamin D 25 hydroxy; Future    3. Screening cholesterol level  -     Lipid Panel    4. Class 1 obesity due to excess calories without serious comorbidity with body mass index (BMI) of 32.0 to 32.9 in adult  Assessment & Plan:  A lower calorie diet and increase exercise is recommended.  Fasting lab work is recommended for further evaluation.  Also consider referral to a dietitian.    Orders:  -     Hemoglobin A1c; Future  -     Insulin, Free & Total, Serum; Future      Follow Up    No follow-ups on file.  Patient was given instructions and counseling regarding her condition or for health maintenance advice. Please see specific information pulled into the AVS if appropriate.

## 2021-11-08 NOTE — ASSESSMENT & PLAN NOTE
A lower calorie diet and increase exercise is recommended.  Fasting lab work is recommended for further evaluation.  Also consider referral to a dietitian.

## 2021-11-08 NOTE — ASSESSMENT & PLAN NOTE
We will increase ropinirole dosage slightly.  She states she does not feel as if she would need the higher dose on some nights.  Does not want to try a different medication at this time.

## 2021-11-08 NOTE — PROGRESS NOTES
Normal magnesium and hemoglobin A1c.  Vitamin D level is improved but borderline low.  I would recommend vitamin D supplementation over-the-counter at 1000 international units daily.

## 2021-11-12 LAB
INSULIN FREE SERPL-ACNC: 7.7 UU/ML
INSULIN SERPL-ACNC: 7.7 UU/ML

## 2021-11-19 RX ORDER — ROPINIROLE 0.5 MG/1
TABLET, FILM COATED ORAL
Qty: 60 TABLET | Refills: 1 | OUTPATIENT
Start: 2021-11-19

## 2022-01-07 ENCOUNTER — APPOINTMENT (OUTPATIENT)
Dept: MAMMOGRAPHY | Facility: HOSPITAL | Age: 37
End: 2022-01-07

## 2022-01-12 ENCOUNTER — HOSPITAL ENCOUNTER (OUTPATIENT)
Dept: ULTRASOUND IMAGING | Facility: HOSPITAL | Age: 37
Discharge: HOME | End: 2022-01-12

## 2022-01-12 ENCOUNTER — HOSPITAL ENCOUNTER (OUTPATIENT)
Dept: MAMMOGRAPHY | Facility: HOSPITAL | Age: 37
Discharge: HOME | End: 2022-01-12

## 2022-01-12 DIAGNOSIS — R92.8 ABNORMAL MAMMOGRAM: ICD-10-CM

## 2022-01-12 DIAGNOSIS — N63.20 LEFT BREAST MASS: ICD-10-CM

## 2022-01-12 PROCEDURE — G0279 TOMOSYNTHESIS, MAMMO: HCPCS

## 2022-01-12 PROCEDURE — 77065 DX MAMMO INCL CAD UNI: CPT

## 2022-01-12 PROCEDURE — 76642 ULTRASOUND BREAST LIMITED: CPT

## 2022-01-19 ENCOUNTER — TELEPHONE (OUTPATIENT)
Dept: PLASTIC SURGERY | Facility: CLINIC | Age: 37
End: 2022-01-19

## 2022-01-19 DIAGNOSIS — R59.9 LYMPH NODE ENLARGEMENT: ICD-10-CM

## 2022-01-19 DIAGNOSIS — R92.8 ABNORMAL MAMMOGRAM: Primary | ICD-10-CM

## 2022-01-19 NOTE — TELEPHONE ENCOUNTER
Called patient regarding enlarged lymph nodes on mammogram. Will refer her to Dr. Moran for this to see if she wants any further work up.

## 2022-01-20 ENCOUNTER — TELEPHONE (OUTPATIENT)
Dept: SURGERY | Facility: CLINIC | Age: 37
End: 2022-01-20

## 2022-01-25 PROBLEM — R92.8 ABNORMAL MAMMOGRAM: Status: ACTIVE | Noted: 2022-01-25

## 2022-02-08 NOTE — PROGRESS NOTES
Chief Complaint: Breast Pain    Subjective         History of Present Illness  Latonia Vasquez is a 36 y.o. female presents to [facility] to be seen for abnormal mammogram sp breast reduction.  She had an asymmetry seen on screening and on diagnositc there was an area of microcysts and mildly enlarged but normal appearing lymph nodes on left.    Narrative & Impression   PROCEDURE:  MAMMO DIAGNOSTIC DIGITAL TOMOSYNTHESIS LEFT W CAD     INDICATIONS:  repeat in 6 months to follow abnormal result per rad     VIEWS:              Routine views of the left breast.  Left mL and left exaggerated CC views.  Focused left   breast ultrasound.     FINDINGS:          There are stable postoperative changes from prior reduction mammoplasty.  No suspicious masses or areas of architectural distortion are identified.  There are no suspicious microcalcifications.    There are a few mildly prominent lymph nodes in the upper outer left breast.     Focused ultrasound examination of the 3 o'clock position of the left breast demonstrates a 5 mm   microcyst cluster.  No suspicious masses or areas of abnormal shadowing are identified.  Focused   ultrasound examination of the upper outer left breast and left axilla demonstrates lymph nodes   measuring up to 1.6 cm x 8 mm with normal echogenic fatty claudia.  No evidence of suspicious mass or   adenopathy.     IMPRESSION:     Benign left mammogram and focused left breast ultrasound.      RECOMMENDATION(S):              CLINICAL EVALUATION.       SCREENING BILATERAL MAMOGRAM WITH 3D TOMOSYNTHESIS         INDICATIONS: SCREENING/6 MONTH POST REDUCTION         FINDINGS:         No suspicious mass, area of architectural distortion or suspicious microcalcification is     identified.  In the right breast there is a small nodular density in possibly architectural     distortion in the posterior aspect of the left breast on the left breast cranial caudad image.            CONCLUSION:         Questionable small nodular density and questionable architectural distortion in the posterior     aspect of the left breast seen only on the cranial caudad image and difficult to visualize on the     left medial lateral oblique image.  Negative right breast mammogram      She has a family history of maternal aunt had a breast lesion removed she is unsure of what kind she thinks may have been a cancer.  She did have a Covid vaccine in the left arm and her last one was about a year ago.  On her diagnostic mammogram and ultrasound the nodes had a normal fatty hilum and had no suspicious adenopathy.  With her having this recent surgery it would be nice to establish new baseline with her mammogram and ultrasounds.  I would like to do a repeat axillary ultrasound in 3 months just to ensure that these nodes have not changed and a repeat left diagnostic mammogram with ultrasound in 6 months.  If there has been no change at that time then we could probably go out to annual imaging if this is what is recommended by radiology.    Objective     Past Medical History:   Diagnosis Date   • Acne    • Anxiety    • Macromastia    • RLS (restless legs syndrome)        Past Surgical History:   Procedure Laterality Date   •  SECTION  2020, ,    • DILATATION AND CURETTAGE     • REDUCTION MAMMAPLASTY  2020         Current Outpatient Medications:   •  ascorbic acid (VITAMIN C) 500 MG capsule controlled-release CR capsule, Vitamin C 500 mg oral capsule, extended release take 1 capsule by oral route once   Active, Disp: , Rfl:   •  rOPINIRole (Requip) 1 MG tablet, Take one and one half tablet 2 hours before bedtime, Disp: 135 tablet, Rfl: 2    No Known Allergies     Family History   Problem Relation Age of Onset   • Heart disease Mother        Social History     Socioeconomic History   • Marital status:    Tobacco Use   • Smoking status: Never Smoker   • Smokeless tobacco: Never  "Used   Vaping Use   • Vaping Use: Never used   Substance and Sexual Activity   • Alcohol use: Yes     Comment: occionally    • Drug use: Never   • Sexual activity: Defer       Vital Signs:   Resp 15   Ht 162.6 cm (64\")   Wt 82.1 kg (181 lb)   BMI 31.07 kg/m²    Review of Systems    Physical Exam  Vitals and nursing note reviewed.   Constitutional:       Appearance: Normal appearance.   HENT:      Head: Normocephalic and atraumatic.   Eyes:      Extraocular Movements: Extraocular movements intact.      Pupils: Pupils are equal, round, and reactive to light.   Cardiovascular:      Pulses: Normal pulses.   Pulmonary:      Effort: Pulmonary effort is normal. No accessory muscle usage or respiratory distress.   Chest:   Breasts:      Right: Normal. No inverted nipple, nipple discharge, skin change, axillary adenopathy or supraclavicular adenopathy.      Left: Normal. No inverted nipple, mass, nipple discharge, skin change, axillary adenopathy or supraclavicular adenopathy.        Comments: Well-healed breast reduction scars  Abdominal:      General: Abdomen is flat.      Palpations: Abdomen is soft.      Tenderness: There is no abdominal tenderness. There is no guarding.   Musculoskeletal:         General: No swelling, tenderness or deformity.      Cervical back: Neck supple.   Lymphadenopathy:      Upper Body:      Right upper body: No supraclavicular or axillary adenopathy.      Left upper body: No supraclavicular or axillary adenopathy.   Skin:     General: Skin is warm and dry.   Neurological:      General: No focal deficit present.      Mental Status: She is alert and oriented to person, place, and time.   Psychiatric:         Mood and Affect: Mood normal.         Thought Content: Thought content normal.          Result Review :               Assessment and Plan    Diagnoses and all orders for this visit:    1. Abnormal mammogram (Primary)  -     US Axilla Left; Future    2. Enlarged lymph nodes in armpit  -     " US Axilla Left; Future    With her having this recent surgery it would be nice to establish new baseline with her mammogram and ultrasounds.  I would like to do a repeat axillary ultrasound in 3 months just to ensure that these nodes have not changed and a repeat left diagnostic mammogram with ultrasound in 6 months.  If there has been no change at that time then we could probably go out to annual imaging if this is what is recommended by radiology.      Follow Up   Return in about 3 months (around 5/9/2022).  Patient was given instructions and counseling regarding her condition or for health maintenance advice. Please see specific information pulled into the AVS if appropriate.         This document has been electronically signed by Alexandria Moran MD  February 9, 2022 09:10 EST

## 2022-02-09 ENCOUNTER — OFFICE VISIT (OUTPATIENT)
Dept: SURGERY | Facility: CLINIC | Age: 37
End: 2022-02-09

## 2022-02-09 VITALS — WEIGHT: 181 LBS | HEIGHT: 64 IN | BODY MASS INDEX: 30.9 KG/M2 | RESPIRATION RATE: 15 BRPM

## 2022-02-09 DIAGNOSIS — R59.0 ENLARGED LYMPH NODES IN ARMPIT: ICD-10-CM

## 2022-02-09 DIAGNOSIS — R92.8 ABNORMAL MAMMOGRAM: Primary | ICD-10-CM

## 2022-02-09 PROCEDURE — 99203 OFFICE O/P NEW LOW 30 MIN: CPT | Performed by: SURGERY

## 2022-03-11 NOTE — PROGRESS NOTES
Progress Note      Patient Name: Claire Vasquez   Patient ID: [id number]   Sex: Female   YOB: 1985    Referring Provider: Josephine CHAVEZ    Visit Date: 2020    Provider: Amy Owens MD   Location: [facility] [place] [place] [facility]   Location Address: [address] [place] [address]  [id number]   Location Phone: [phone]          History Of Present Illness  Claire Vasquez is a 35 year old /White female who presents to the office today as a consult from Josephine CHAVEZ.   Claire Vasquez is a 34 year old /White female who presents to the office today for a follow up visit status post breast reduction on 11/3/2020. She is doing very well.   Pain Rating on the Josselyn's Scale is: none       Past Medical History  Acne; Anxiety; Macromastia         Past Surgical History  breast reduction;  section; Dilatation and curettage         Medication List  ibuprofen oral; Requip 0.25 mg oral tablet; Vitamin C 500 mg oral capsule, extended release; Vitamin D3 100 mcg (4,000 unit) oral capsule         Allergy List  NO KNOWN DRUG ALLERGIES       Allergies Reconciled  Family Medical History  Heart Disease         Social History  Tobacco (Never)         Vitals  Date Time BP Position Site L\R Cuff Size HR RR TEMP (F) WT  HT  BMI kg/m2 BSA m2 O2 Sat FR L/min FiO2 HC       2020 01:06 /82 Sitting    75 - R  97.7     96 %  21%          Physical Examination  · Constitutional  o Appearance  o : well developed, well-nourished, Alert and oriented X3  · Respiratory  o Respiratory Effort  o : breathing unlabored   · Cardiovascular  o Heart  o : regular rate  · Skin and Subcutaneous Tissue  o Surgical Incision  o : breasts soft, good symmetry, left breast slight tether, firmness inferior          Assessment  · Postoperative follow-up     V67.00/Z09  · Macromastia       Hypertrophy of breast     611.1/N62      Plan  · Orders  o Plastics reconstructive visit  Bedside Mobility Assessment Tool (BMAT):     Assessment Level 1- Sit and Shake    1. From a semi-reclined position, ask patient to sit up and rotate to a seated position at the side of the bed. Can use the bedrail. 2. Ask patient to reach out and grab your hand and shake making sure patient reaches across his/her midline. Pass- Patient is able to come to a seated position, maintain core strength. Maintains seated balance while reaching across midline. Move on to Assessment Level 2. Assessment Level 2- Stretch and Point   1. With patient in seated position at the side of the bed, have patient place both feet on the floor (or stool) with knees no higher than hips. 2. Ask patient to stretch one leg and straighten the knee, then bend the ankle/flex and point the toes. If appropriate, repeat with the other leg. Pass- Patient is able to demonstrate appropriate quad strength on intended weight bearing limb(s). Move onto Assessment Level 3. Assessment Level 3- Stand   1. Ask patient to elevate off the bed or chair (seated to standing) using an assistive device (cane, bedrail). 2. Patient should be able to raise buttocks off be and hold for a count of five. May repeat once. Pass- Patient maintains standing stability for at least 5 seconds, proceed to assessment level 4. Assessment Level 4- Walk   1. Ask patient to march in place at bedside. 2. Then ask patient to advance step and return each foot. Some medical conditions may render a patient from stepping backwards, use your best clinical judgement. Pass- Patient demonstrates balance while shifting weight and ability to step, takes independent steps, does not use assistive device patient is MOBILITY LEVEL 4.       Mobility Level- 4 ([facility]REC) - - 12/31/2020  o Mammogram breast screening 2D digital bilateral. (71508, ) - 611.1/N62 - 05/04/2021   S/p breast reduction 11/3/2020. New baseline mammogram. Pt prefers [place].   · Medications  o Medications have been Reconciled  o Transition of Care or Provider Policy  · Instructions  o bra, aggressive scar massage, rtc 6 months after mamm            Electronically Signed by: Amy Owens MD -Author on December 31, 2020 03:34:21 PM

## 2022-04-06 ENCOUNTER — HOSPITAL ENCOUNTER (OUTPATIENT)
Dept: ULTRASOUND IMAGING | Facility: HOSPITAL | Age: 37
Discharge: HOME | End: 2022-04-06
Admitting: SURGERY

## 2022-04-06 DIAGNOSIS — R92.8 ABNORMAL MAMMOGRAM: ICD-10-CM

## 2022-04-06 DIAGNOSIS — R59.0 ENLARGED LYMPH NODES IN ARMPIT: ICD-10-CM

## 2022-04-06 PROCEDURE — 76642 ULTRASOUND BREAST LIMITED: CPT

## 2022-05-04 ENCOUNTER — TELEPHONE (OUTPATIENT)
Dept: SURGERY | Facility: CLINIC | Age: 37
End: 2022-05-04

## 2022-05-04 NOTE — TELEPHONE ENCOUNTER
Caller: LAYO JEWELL     Relationship: SELF     Best call back number: [phone]    PT WANTS TO KNOW IF HER 3 MO F/U APPT ON 5/11/22 IS NECESSARY TO GO TO.

## 2022-05-10 ENCOUNTER — TELEPHONE (OUTPATIENT)
Dept: SURGERY | Facility: CLINIC | Age: 37
End: 2022-05-10

## 2022-05-10 NOTE — TELEPHONE ENCOUNTER
Provider: DR CELESTE  Caller: LAYO JEWELL  Relationship to Patient: SELF    Phone Number: [phone]    Reason for Call: PT IS CALLING TO FOLLOW UP ON PREVIOUS CALL TO SEE IF DR CELESTE NEEDS HER TO COME IN TO GO OVER RESULTS OR IF THIS CAN BE DONE OVER THE PHONE.    PT REQUESTED TO GO AHEAD AND CANCEL APPT AND SHE WILL RESCHEDULE IF NEEDED.    PT CAN BE REACHED ANYTIME; OKAY TO LEAVE MESSAGE IF NO ANSWER.

## 2022-07-11 ENCOUNTER — TELEPHONE (OUTPATIENT)
Dept: SURGERY | Facility: CLINIC | Age: 37
End: 2022-07-11

## 2022-07-11 NOTE — TELEPHONE ENCOUNTER
SPOKE TO PT TO R/S CX APPT FROM 7/11 WITH B[facility]ER AND SHE STATED SHE DOESN'T WANT TO R/S RIGHT NOW AND THINKS SHE'S GOOD/ANYTHING ELSE TO DO?

## 2022-07-22 DIAGNOSIS — G25.81 RESTLESS LEG SYNDROME: ICD-10-CM

## 2022-07-22 RX ORDER — ROPINIROLE 1 MG/1
TABLET, FILM COATED ORAL
Qty: 45 TABLET | Refills: 0 | Status: SHIPPED | OUTPATIENT
Start: 2022-07-22 | End: 2022-11-22 | Stop reason: SDUPTHER

## 2022-07-22 NOTE — TELEPHONE ENCOUNTER
Caller: Latonia Vasquez    Relationship: Self    Best call back number: [phone]    Requested Prescriptions:   Requested Prescriptions     Pending Prescriptions Disp Refills   • rOPINIRole (Requip) 1 MG tablet 135 tablet 2     Sig: Take one and one half tablet 2 hours before bedtime        Pharm[facility] where request [facility] be sent[facility] [address] [place]  - [phone]  - [phone] FX     Additional details provided by patient:     THE PATIENT SAID THERE ARE NO REFILLS ON THIS MEDICATION AND IS REQUESTING REFILLS     Does the patient have less than a 3 day supply:  [] Yes  [x] No    Francisca Rainey   07/22/22 09:26 EDT

## 2022-09-06 PROBLEM — N93.9 ABNORMAL UTERINE BLEEDING (AUB): Status: ACTIVE | Noted: 2022-09-06

## 2022-09-06 NOTE — PROGRESS NOTES
GYN Visit    CC: AUB    HPI:   36 y.o. Contraception or HRT: Contraception:  Tubal ligation  Menses:   q 30 days, lasts 7 days, changes products q 2-3 hrs on heaviest days.   Pain:  Moderate, not too bad    Pt has concerns she would like to discuss.          History: PMHx, Meds, Allergies, PSHx, Social Hx, and POBHx all reviewed and updated.  Physical Exam   PHYSICAL EXAM:  /85   Pulse 88   Wt 83.9 kg (185 lb)   LMP 2022 Comment: AUB since   Breastfeeding No   BMI 31.76 kg/m²   General- NAD, alert and oriented, appropriate  Psych- Normal mood, good memory    Neck- No masses, no thyroid enlargement  CV- Regular rhythm, no murnurs  Resp- CTA to bases, no wheezes  Abdomen- Soft, non distended, non tender, no masses  External genitalia- Normal female, no lesions  Urethra/meatus- Normal, no masses, non tender, no prolapse  Bladder- Normal, no masses, non tender, no prolapse  Vagina- Normal, no atrophy, no lesions, no discharge, no prolapse menstrual blood in the vault  Cvx- Normal, no lesions, no discharge, No cervical motion tenderness  Uterus- Uterus is normal sized and nontender but with significant, DECREASED MOBILITY  Adnexa- No mass, non tender  Anus/Rectum/Perineum- Not performed    Lymphatic- No palpable neck, axillary, or groin nodes  Ext- No edema, no cyanosis    Skin- No lesions, no rashes, no acanthosis nigricans        ASSESSMENT AND PLAN:  Diagnoses and all orders for this visit:    1. Abnormal uterine bleeding (AUB) (Primary)  Assessment & Plan:  States her periods are increasingly heavy  Her last cycle lasted almost 4 weeks, with heavy, painful clots  Had a bilateral salpingectomy at time of her last c/s 2 years prior  States periods have always been heavy with cramping and clots but this is getting worse  Had has 4 c/s and has had a D&E    Orders:  -     CBC (No Diff)  -     Prolactin  -     T4, Free  -     TSH  -     US Pelvis Transvaginal Non OB; Future  -     IgP, Aptima  HPV    2. Dysmenorrhea  Assessment & Plan:  Bleeding is worse than pain        Counseling: TRACK MENSES, RTO if <q21d, >7d long, heavy or painful.          Follow Up:  Return for post ultrasound .          Rahel Appiah MD  09/07/2022

## 2022-09-07 ENCOUNTER — OFFICE VISIT (OUTPATIENT)
Dept: OBSTETRICS AND GYNECOLOGY | Facility: CLINIC | Age: 37
End: 2022-09-07

## 2022-09-07 VITALS
WEIGHT: 185 LBS | DIASTOLIC BLOOD PRESSURE: 85 MMHG | HEART RATE: 88 BPM | SYSTOLIC BLOOD PRESSURE: 122 MMHG | BODY MASS INDEX: 31.76 KG/M2

## 2022-09-07 DIAGNOSIS — N94.6 DYSMENORRHEA: ICD-10-CM

## 2022-09-07 DIAGNOSIS — N93.9 ABNORMAL UTERINE BLEEDING (AUB): Primary | ICD-10-CM

## 2022-09-07 LAB
DEPRECATED RDW RBC AUTO: 40.7 FL (ref 37–54)
ERYTHROCYTE [DISTWIDTH] IN BLOOD BY AUTOMATED COUNT: 14.8 % (ref 12.3–15.4)
HCT VFR BLD AUTO: 40 % (ref 34–46.6)
HGB BLD-MCNC: 12.6 G/DL (ref 12–15.9)
MCH RBC QN AUTO: 24.1 PG (ref 26.6–33)
MCHC RBC AUTO-ENTMCNC: 31.5 G/DL (ref 31.5–35.7)
MCV RBC AUTO: 76.5 FL (ref 79–97)
PLATELET # BLD AUTO: 215 10*3/MM3 (ref 140–450)
PMV BLD AUTO: 12.4 FL (ref 6–12)
RBC # BLD AUTO: 5.23 10*6/MM3 (ref 3.77–5.28)
WBC NRBC COR # BLD: 7.09 10*3/MM3 (ref 3.4–10.8)

## 2022-09-07 PROCEDURE — 87624 HPV HI-RISK TYP POOLED RSLT: CPT | Performed by: OBSTETRICS & GYNECOLOGY

## 2022-09-07 PROCEDURE — 99214 OFFICE O/P EST MOD 30 MIN: CPT | Performed by: OBSTETRICS & GYNECOLOGY

## 2022-09-07 PROCEDURE — 84443 ASSAY THYROID STIM HORMONE: CPT | Performed by: OBSTETRICS & GYNECOLOGY

## 2022-09-07 PROCEDURE — 85027 COMPLETE CBC AUTOMATED: CPT | Performed by: OBSTETRICS & GYNECOLOGY

## 2022-09-07 PROCEDURE — 84146 ASSAY OF PROLACTIN: CPT | Performed by: OBSTETRICS & GYNECOLOGY

## 2022-09-07 PROCEDURE — 84439 ASSAY OF FREE THYROXINE: CPT | Performed by: OBSTETRICS & GYNECOLOGY

## 2022-09-07 PROCEDURE — G0123 SCREEN CERV/VAG THIN LAYER: HCPCS | Performed by: OBSTETRICS & GYNECOLOGY

## 2022-09-07 NOTE — ASSESSMENT & PLAN NOTE
States her periods are increasingly heavy  Her last cycle lasted almost 4 weeks, with heavy, painful clots  Had a bilateral salpingectomy at time of her last c/s 2 years prior  States periods have always been heavy with cramping and clots but this is getting worse  Had has 4 c/s and has had a D&E

## 2022-09-08 LAB
PROLACTIN SERPL-MCNC: 14.3 NG/ML (ref 4.79–23.3)
T4 FREE SERPL-MCNC: 0.92 NG/DL (ref 0.93–1.7)
TSH SERPL DL<=0.05 MIU/L-ACNC: 0.95 UIU/ML (ref 0.27–4.2)

## 2022-09-13 LAB
CYTOLOGIST CVX/VAG CYTO: NORMAL
CYTOLOGY CVX/VAG DOC CYTO: NORMAL
CYTOLOGY CVX/VAG DOC THIN PREP: NORMAL
DX ICD CODE: NORMAL
HIV 1 & 2 AB SER-IMP: NORMAL
HPV I/H RISK 4 DNA CVX QL PROBE+SIG AMP: NEGATIVE
OTHER STN SPEC: NORMAL
STAT OF ADQ CVX/VAG CYTO-IMP: NORMAL

## 2022-09-26 ENCOUNTER — HOSPITAL ENCOUNTER (OUTPATIENT)
Dept: ULTRASOUND IMAGING | Facility: HOSPITAL | Age: 37
Discharge: HOME | End: 2022-09-26
Admitting: OBSTETRICS & GYNECOLOGY

## 2022-09-26 DIAGNOSIS — N93.9 ABNORMAL UTERINE BLEEDING (AUB): ICD-10-CM

## 2022-09-26 PROCEDURE — 76856 US EXAM PELVIC COMPLETE: CPT

## 2022-09-26 PROCEDURE — 76830 TRANSVAGINAL US NON-OB: CPT

## 2022-11-21 DIAGNOSIS — G25.81 RESTLESS LEG SYNDROME: ICD-10-CM

## 2022-11-22 DIAGNOSIS — G25.81 RESTLESS LEG SYNDROME: ICD-10-CM

## 2022-11-22 RX ORDER — ROPINIROLE 1 MG/1
TABLET, FILM COATED ORAL
Qty: 45 TABLET | Refills: 0 | Status: SHIPPED | OUTPATIENT
Start: 2022-11-22 | End: 2022-11-30 | Stop reason: SDUPTHER

## 2022-11-22 RX ORDER — ROPINIROLE 1 MG/1
TABLET, FILM COATED ORAL
Qty: 45 TABLET | Refills: 0 | OUTPATIENT
Start: 2022-11-22

## 2022-11-30 ENCOUNTER — OFFICE VISIT (OUTPATIENT)
Dept: FAMILY MEDICINE CLINIC | Age: 37
End: 2022-11-30

## 2022-11-30 VITALS
HEIGHT: 64 IN | WEIGHT: 186 LBS | SYSTOLIC BLOOD PRESSURE: 115 MMHG | DIASTOLIC BLOOD PRESSURE: 70 MMHG | HEART RATE: 79 BPM | BODY MASS INDEX: 31.76 KG/M2

## 2022-11-30 DIAGNOSIS — F41.9 ANXIETY: ICD-10-CM

## 2022-11-30 DIAGNOSIS — M54.42 CHRONIC BILATERAL LOW BACK PAIN WITH BILATERAL SCIATICA: ICD-10-CM

## 2022-11-30 DIAGNOSIS — E55.9 VITAMIN D DEFICIENCY: ICD-10-CM

## 2022-11-30 DIAGNOSIS — G89.29 CHRONIC BILATERAL LOW BACK PAIN WITH BILATERAL SCIATICA: ICD-10-CM

## 2022-11-30 DIAGNOSIS — M54.41 CHRONIC BILATERAL LOW BACK PAIN WITH BILATERAL SCIATICA: ICD-10-CM

## 2022-11-30 DIAGNOSIS — R20.0 NUMBNESS AND TINGLING IN RIGHT HAND: ICD-10-CM

## 2022-11-30 DIAGNOSIS — R20.2 NUMBNESS AND TINGLING IN RIGHT HAND: ICD-10-CM

## 2022-11-30 DIAGNOSIS — E78.2 MIXED HYPERLIPIDEMIA: ICD-10-CM

## 2022-11-30 DIAGNOSIS — G25.81 RESTLESS LEG SYNDROME: Primary | ICD-10-CM

## 2022-11-30 PROBLEM — E66.811 CLASS 1 OBESITY DUE TO EXCESS CALORIES WITHOUT SERIOUS COMORBIDITY WITH BODY MASS INDEX (BMI) OF 32.0 TO 32.9 IN ADULT: Status: RESOLVED | Noted: 2021-11-04 | Resolved: 2022-11-30

## 2022-11-30 PROBLEM — E66.09 CLASS 1 OBESITY DUE TO EXCESS CALORIES WITHOUT SERIOUS COMORBIDITY WITH BODY MASS INDEX (BMI) OF 32.0 TO 32.9 IN ADULT: Status: RESOLVED | Noted: 2021-11-04 | Resolved: 2022-11-30

## 2022-11-30 PROBLEM — Z13.220 SCREENING CHOLESTEROL LEVEL: Status: RESOLVED | Noted: 2021-11-04 | Resolved: 2022-11-30

## 2022-11-30 PROCEDURE — 99214 OFFICE O/P EST MOD 30 MIN: CPT | Performed by: NURSE PRACTITIONER

## 2022-11-30 RX ORDER — ROPINIROLE 1 MG/1
TABLET, FILM COATED ORAL
Qty: 45 TABLET | Refills: 11 | Status: SHIPPED | OUTPATIENT
Start: 2022-11-30

## 2022-11-30 RX ORDER — ESCITALOPRAM OXALATE 10 MG/1
TABLET ORAL
Qty: 30 TABLET | Refills: 2 | Status: SHIPPED | OUTPATIENT
Start: 2022-11-30 | End: 2023-01-11 | Stop reason: SDUPTHER

## 2022-11-30 NOTE — ASSESSMENT & PLAN NOTE
Recommend a lower cholesterol and lower saturated fat diet and increasing exercise.  Defers follow-up testing at this time.  Let me know when she is ready to have a fasting lipid panel rechecked.

## 2022-11-30 NOTE — ASSESSMENT & PLAN NOTE
Defers x-ray imaging or referral to physical therapy at this time.  Will consider and let me know if she wants to pursue further testing or management.  Conservative measures such as anti-inflammatories, application of heat and cold, stretching, and proper lifting mechanics are recommended.

## 2022-11-30 NOTE — ASSESSMENT & PLAN NOTE
To start Lexapro.  Patient I feel as if this would cover her symptoms better than buspirone at this time.  Encourage behavioral modification and follow-up in 6 weeks or sooner if concerns.

## 2022-11-30 NOTE — PROGRESS NOTES
"Chief Complaint  Annual Exam    Subjective          Latonia Vasquez presents to [facility]     Patient is a 36-year-old female who is here today to follow-up regarding restless leg syndrome.  Symptoms are currently stable with use of ropinirole 1 mg tablet at either 1 tablet or 1-1/2 tablets at bedtime.  Denies side effects and requests refills.  She states her legs sometimes ache during the day and does report some intermittent low back pain with some radiation of symptoms into both lower extremities.  She denies any injury.      Intermittent numbness of the right hand without neck pain or injury.  She is right-hand dominant and suspects she may have some early carpal tunnel syndrome.  Symptoms are not interfering with daily functioning.    History of vitamin D deficiency not currently taking a vitamin D supplement.    Some increasing concerns of anxiety and intermittent tearfulness when she feels stressed.  Stressors include work and family responsibilities and helping children with homework every night.  She denies depression.  Last menstrual cycle was 3 weeks ago.  Recently saw gynecology and had a negative Pap smear and further testing due to evaluation of heavy periods.  History of tubal ligation.    History of mild elevations of cholesterol.  Last levels done 1 year ago.  Not certain if she was fasting or not.  She is not following any particular cholesterol restricted diet.  Objective   Vital Signs:   Vitals:    11/30/22 0803   BP: 115/70   BP Location: Right arm   Patient Position: Sitting   Pulse: 79   Weight: 84.4 kg (186 lb)   Height: 162.6 cm (64\")      Body mass index is 31.93 kg/m².  Physical Exam  Vitals reviewed.   Constitutional:       General: She is not in acute distress.     Appearance: Normal appearance. She is well-developed.   Neck:      Thyroid: No thyroid mass, thyromegaly or thyroid tenderness.   Cardiovascular:      Rate and Rhythm: Normal rate and regular " rhythm.      Pulses:           Posterior tibial pulses are 2+ on the right side and 2+ on the left side.      Heart sounds: Normal heart sounds.   Pulmonary:      Effort: Pulmonary effort is normal.      Breath sounds: Normal breath sounds.   Musculoskeletal:      Right lower leg: No edema.      Left lower leg: No edema.   Skin:     General: Skin is warm and dry.   Neurological:      General: No focal deficit present.      Mental Status: She is alert.   Psychiatric:         Attention and Perception: Attention normal.         Mood and Affect: Mood and affect normal.         Behavior: Behavior normal.           Current Outpatient Medications:   •  ascorbic acid (VITAMIN C) 500 MG capsule controlled-release CR capsule, Vitamin C 500 mg oral capsule, extended release take 1 capsule by oral route once   Active, Disp: , Rfl:   •  ELDERBERRY PO, Take  by mouth., Disp: , Rfl:   •  rOPINIRole (Requip) 1 MG tablet, Take one and one half tablet 2 hours before bedtime, Disp: 45 tablet, Rfl: 11  •  escitalopram (Lexapro) 10 MG tablet, Take one half tablet at bedtime x 1 week then increase to 1 tablet once daily, Disp: 30 tablet, Rfl: 2       Result Review :         CBC    CBC 9/7/22   WBC 7.09   RBC 5.23   Hemoglobin 12.6   Hematocrit 40.0   MCV 76.5 (A)   MCH 24.1 (A)   MCHC 31.5   RDW 14.8   Platelets 215   (A) Abnormal value            CBC w/diff    CBC w/Diff 9/7/22   WBC 7.09   RBC 5.23   Hemoglobin 12.6   Hematocrit 40.0   MCV 76.5 (A)   MCH 24.1 (A)   MCHC 31.5   RDW 14.8   Platelets 215   (A) Abnormal value                TSH    TSH 9/7/22   TSH 0.951                        Assessment and Plan    Diagnoses and all orders for this visit:    1. Restless leg syndrome (Primary)  Assessment & Plan:  Continue current treatment.  Follow-up in 1 year regarding this issue.    Orders:  -     rOPINIRole (Requip) 1 MG tablet; Take one and one half tablet 2 hours before bedtime  Dispense: 45 tablet; Refill: 11    2. Vitamin D  deficiency  Assessment & Plan:  Defers labs at this time.  Recommend taking a minimum of 1000 international units of vitamin D over-the-counter daily.      3. Chronic bilateral low back pain with bilateral sciatica  Assessment & Plan:  Defers x-ray imaging or referral to physical therapy at this time.  Will consider and let me know if she wants to pursue further testing or management.  Conservative measures such as anti-inflammatories, application of heat and cold, stretching, and proper lifting mechanics are recommended.      4. Numbness and tingling in right hand  Assessment & Plan:  Options for management are conservative including wrist splints.  Handout is provided.  Could also see a hand specialist or have a nerve conduction study done.  Patient will consider and let me know she wants to pursue these options.      5. Anxiety  Assessment & Plan:  To start Lexapro.  Patient I feel as if this would cover her symptoms better than buspirone at this time.  Encourage behavioral modification and follow-up in 6 weeks or sooner if concerns.    Orders:  -     escitalopram (Lexapro) 10 MG tablet; Take one half tablet at bedtime x 1 week then increase to 1 tablet once daily  Dispense: 30 tablet; Refill: 2    6. Mixed hyperlipidemia  Assessment & Plan:  Recommend a lower cholesterol and lower saturated fat diet and increasing exercise.  Defers follow-up testing at this time.  Let me know when she is ready to have a fasting lipid panel rechecked.        Follow Up    Return in about 6 weeks (around 1/11/2023).  Patient was given instructions and counseling regarding her condition or for health maintenance advice. Please see specific information pulled into the AVS if appropriate.

## 2022-11-30 NOTE — ASSESSMENT & PLAN NOTE
Options for management are conservative including wrist splints.  Handout is provided.  Could also see a hand specialist or have a nerve conduction study done.  Patient will consider and let me know she wants to pursue these options.

## 2022-11-30 NOTE — ASSESSMENT & PLAN NOTE
Defers labs at this time.  Recommend taking a minimum of 1000 international units of vitamin D over-the-counter daily.

## 2023-01-11 DIAGNOSIS — F41.9 ANXIETY: ICD-10-CM

## 2023-01-11 RX ORDER — ESCITALOPRAM OXALATE 10 MG/1
TABLET ORAL
Qty: 90 TABLET | Refills: 0 | Status: SHIPPED | OUTPATIENT
Start: 2023-01-11 | End: 2023-03-08

## 2023-02-28 ENCOUNTER — TELEPHONE (OUTPATIENT)
Dept: SURGERY | Facility: CLINIC | Age: 38
End: 2023-02-28
Payer: COMMERCIAL

## 2023-03-08 ENCOUNTER — OFFICE VISIT (OUTPATIENT)
Dept: FAMILY MEDICINE CLINIC | Age: 38
End: 2023-03-08
Payer: COMMERCIAL

## 2023-03-08 VITALS
OXYGEN SATURATION: 98 % | WEIGHT: 186 LBS | BODY MASS INDEX: 31.76 KG/M2 | HEIGHT: 64 IN | SYSTOLIC BLOOD PRESSURE: 119 MMHG | DIASTOLIC BLOOD PRESSURE: 81 MMHG | HEART RATE: 77 BPM

## 2023-03-08 DIAGNOSIS — R22.1 NECK ENLARGEMENT: ICD-10-CM

## 2023-03-08 DIAGNOSIS — F41.9 ANXIETY: Primary | ICD-10-CM

## 2023-03-08 PROCEDURE — 99213 OFFICE O/P EST LOW 20 MIN: CPT | Performed by: NURSE PRACTITIONER

## 2023-03-08 RX ORDER — BUSPIRONE HYDROCHLORIDE 5 MG/1
5 TABLET ORAL 2 TIMES DAILY
Qty: 60 TABLET | Refills: 3 | Status: SHIPPED | OUTPATIENT
Start: 2023-03-08

## 2023-03-08 NOTE — ASSESSMENT & PLAN NOTE
Concerned with side effects of escitalopram.  To start trial of buspirone.  If buspirone is not tolerated or is ineffective, consider potential use of bupropion.  Follow-up if not improving within 4 to 6 weeks.

## 2023-03-08 NOTE — PROGRESS NOTES
"Chief Complaint  Anxiety (6 week follow up - stopped taking lexapro due to weight gain wants to discuss other options )    Subjective          Latonia Vasquez presents to [facility]     Patient is a 37-year-old female who is here today to follow-up regarding anxiety.  After last visit started escitalopram 10 mg daily.  Did feel some mild benefit from dosing but feels as if her appetite increase and that she experienced a couple pounds of weight gain.  She discontinued medication after approximately 1 month due to these concerns.  Denies problems with sleep or depression.  Last menstrual cycle is current     Objective   Vital Signs:   Vitals:    03/08/23 1122   BP: 119/81   BP Location: Left arm   Patient Position: Sitting   Cuff Size: Large Adult   Pulse: 77   SpO2: 98%   Weight: 84.4 kg (186 lb)   Height: 162.6 cm (64\")      Body mass index is 31.93 kg/m².  Physical Exam  Vitals reviewed.   Constitutional:       General: She is not in acute distress.     Appearance: Normal appearance. She is well-developed.   Neck:     Cardiovascular:      Rate and Rhythm: Normal rate and regular rhythm.      Heart sounds: Normal heart sounds.   Pulmonary:      Effort: Pulmonary effort is normal.      Breath sounds: Normal breath sounds.   Musculoskeletal:      Right lower leg: No edema.      Left lower leg: No edema.   Skin:     General: Skin is warm and dry.   Neurological:      General: No focal deficit present.      Mental Status: She is alert.   Psychiatric:         Attention and Perception: Attention normal.         Mood and Affect: Mood and affect normal.         Behavior: Behavior normal.           Current Outpatient Medications:   •  ascorbic acid (VITAMIN C) 500 MG capsule controlled-release CR capsule, Vitamin C 500 mg oral capsule, extended release take 1 capsule by oral route once   Active, Disp: , Rfl:   •  rOPINIRole (Requip) 1 MG tablet, Take one and one half tablet 2 hours before " bedtime, Disp: 45 tablet, Rfl: 11  •  busPIRone (BUSPAR) 5 MG tablet, Take 1 tablet by mouth 2 (Two) Times a Day., Disp: 60 tablet, Rfl: 3       Result Review :         CBC    CBC 9/7/22   WBC 7.09   RBC 5.23   Hemoglobin 12.6   Hematocrit 40.0   MCV 76.5 (A)   MCH 24.1 (A)   MCHC 31.5   RDW 14.8   Platelets 215   (A) Abnormal value            CBC w/diff    CBC w/Diff 9/7/22   WBC 7.09   RBC 5.23   Hemoglobin 12.6   Hematocrit 40.0   MCV 76.5 (A)   MCH 24.1 (A)   MCHC 31.5   RDW 14.8   Platelets 215   (A) Abnormal value                TSH    TSH 9/7/22   TSH 0.951                        Assessment and Plan    Diagnoses and all orders for this visit:    1. Anxiety (Primary)  Assessment & Plan:  Concerned with side effects of escitalopram.  To start trial of buspirone.  If buspirone is not tolerated or is ineffective, consider potential use of bupropion.  Follow-up if not improving within 4 to 6 weeks.    Orders:  -     busPIRone (BUSPAR) 5 MG tablet; Take 1 tablet by mouth 2 (Two) Times a Day.  Dispense: 60 tablet; Refill: 3    2. Neck enlargement  Assessment & Plan:  See thyroid blood test September 2022.  Further treatment pending ultrasound results    Orders:  -     US Head Neck Soft Tissue; Future      Follow Up    No follow-ups on file.  Patient was given instructions and counseling regarding her condition or for health maintenance advice. Please see specific information pulled into the AVS if appropriate.

## 2023-03-15 NOTE — PROGRESS NOTES
"Chief Complaint: Breast Mass    Subjective         History of Present Illness  Latonia Vasquez is a 37 y.o. female presents to [facility] to be seen for re-examination of left breast nodule after bilateral breast reduction.  Today the area is barely palpable and decreased in size.  Her last imaging was last year so she is due for repeat imaging.      Objective     Past Medical History:   Diagnosis Date   • Acne    • Anxiety    • Macromastia    • RLS (restless legs syndrome)        Past Surgical History:   Procedure Laterality Date   •  SECTION  2020, ,    • DILATATION AND CURETTAGE     • POSTPARTUM TUBAL LIGATION  2020   • REDUCTION MAMMAPLASTY  2020         Current Outpatient Medications:   •  ascorbic acid (VITAMIN C) 500 MG capsule controlled-release CR capsule, Vitamin C 500 mg oral capsule, extended release take 1 capsule by oral route once   Active, Disp: , Rfl:   •  busPIRone (BUSPAR) 5 MG tablet, Take 1 tablet by mouth 2 (Two) Times a Day., Disp: 60 tablet, Rfl: 3  •  rOPINIRole (Requip) 1 MG tablet, Take one and one half tablet 2 hours before bedtime, Disp: 45 tablet, Rfl: 11    No Known Allergies     Family History   Problem Relation Age of Onset   • Heart disease Mother        Social History     Socioeconomic History   • Marital status:    Tobacco Use   • Smoking status: Never   • Smokeless tobacco: Never   Vaping Use   • Vaping Use: Never used   Substance and Sexual Activity   • Alcohol use: Yes     Comment: occionally    • Drug use: Never   • Sexual activity: Defer       Vital Signs:   Resp 16   Ht 162.6 cm (64\")   Wt 84.4 kg (186 lb)   BMI 31.93 kg/m²    Review of Systems    Physical Exam  Vitals and nursing note reviewed.   Constitutional:       Appearance: Normal appearance.   HENT:      Head: Normocephalic and atraumatic.   Eyes:      Extraocular Movements: Extraocular movements intact.      Pupils: Pupils are equal, " round, and reactive to light.   Cardiovascular:      Pulses: Normal pulses.   Pulmonary:      Effort: Pulmonary effort is normal. No accessory muscle usage or respiratory distress.   Chest:   Breasts:     Right: Normal. No inverted nipple, mass, nipple discharge or skin change.      Left: Normal. No inverted nipple, mass, nipple discharge or skin change.      Comments: Nodularity out laterally on side of breast, no distinct breast masses  Abdominal:      General: Abdomen is flat.      Palpations: Abdomen is soft.      Tenderness: There is no abdominal tenderness. There is no guarding.   Musculoskeletal:         General: No swelling, tenderness or deformity.      Cervical back: Neck supple.   Lymphadenopathy:      Upper Body:      Right upper body: No supraclavicular or axillary adenopathy.      Left upper body: No supraclavicular or axillary adenopathy.   Skin:     General: Skin is warm and dry.   Neurological:      General: No focal deficit present.      Mental Status: She is alert and oriented to person, place, and time.   Psychiatric:         Mood and Affect: Mood normal.         Thought Content: Thought content normal.          Result Review :               Assessment and Plan    Diagnoses and all orders for this visit:    1. Mass of breast, unspecified laterality (Primary)  -     Mammo Screening Bilateral With CAD; Future  -     US Breast Left Limited; Future    Will check breast imaging   Call with abnormal results        Follow Up   Return in about 1 year (around 3/16/2024).  Patient was given instructions and counseling regarding her condition or for health maintenance advice. Please see specific information pulled into the AVS if appropriate.         This document has been electronically signed by Alexandria Moran MD  March 16, 2023 15:42 EDT

## 2023-03-16 ENCOUNTER — OFFICE VISIT (OUTPATIENT)
Dept: SURGERY | Facility: CLINIC | Age: 38
End: 2023-03-16
Payer: COMMERCIAL

## 2023-03-16 VITALS — HEIGHT: 64 IN | BODY MASS INDEX: 31.76 KG/M2 | RESPIRATION RATE: 16 BRPM | WEIGHT: 186 LBS

## 2023-03-16 DIAGNOSIS — N63.0 MASS OF BREAST, UNSPECIFIED LATERALITY: Primary | ICD-10-CM

## 2023-03-16 PROCEDURE — 99213 OFFICE O/P EST LOW 20 MIN: CPT | Performed by: SURGERY

## 2023-03-17 ENCOUNTER — HOSPITAL ENCOUNTER (OUTPATIENT)
Dept: ULTRASOUND IMAGING | Facility: HOSPITAL | Age: 38
Discharge: HOME | End: 2023-03-17
Admitting: NURSE PRACTITIONER
Payer: COMMERCIAL

## 2023-03-17 DIAGNOSIS — R22.1 NECK ENLARGEMENT: ICD-10-CM

## 2023-03-17 DIAGNOSIS — N63.0 BREAST NODULE: Primary | ICD-10-CM

## 2023-03-17 PROCEDURE — 76536 US EXAM OF HEAD AND NECK: CPT

## 2023-03-20 NOTE — PROGRESS NOTES
Please call patient.  Two thyroid nodules which need further evaluation.  I recommend referral to ENT to evaluate.

## 2023-03-21 DIAGNOSIS — E01.0 THYROMEGALY: Primary | ICD-10-CM

## 2023-03-21 DIAGNOSIS — R93.89 ABNORMAL THYROID ULTRASOUND: ICD-10-CM

## 2023-04-28 ENCOUNTER — HOSPITAL ENCOUNTER (OUTPATIENT)
Dept: ULTRASOUND IMAGING | Facility: HOSPITAL | Age: 38
Discharge: HOME | End: 2023-04-28
Payer: COMMERCIAL

## 2023-04-28 ENCOUNTER — HOSPITAL ENCOUNTER (OUTPATIENT)
Dept: MAMMOGRAPHY | Facility: HOSPITAL | Age: 38
Discharge: HOME | End: 2023-04-28
Payer: COMMERCIAL

## 2023-04-28 DIAGNOSIS — N63.0 MASS OF BREAST, UNSPECIFIED LATERALITY: ICD-10-CM

## 2023-04-28 DIAGNOSIS — N63.0 BREAST NODULE: ICD-10-CM

## 2023-04-28 PROCEDURE — 76642 ULTRASOUND BREAST LIMITED: CPT

## 2023-04-28 PROCEDURE — G0279 TOMOSYNTHESIS, MAMMO: HCPCS

## 2023-04-28 PROCEDURE — 77066 DX MAMMO INCL CAD BI: CPT

## 2023-11-20 DIAGNOSIS — G25.81 RESTLESS LEG SYNDROME: ICD-10-CM

## 2023-11-21 RX ORDER — ROPINIROLE 1 MG/1
TABLET, FILM COATED ORAL
Qty: 45 TABLET | Refills: 0 | Status: SHIPPED | OUTPATIENT
Start: 2023-11-21

## 2023-12-08 ENCOUNTER — OFFICE VISIT (OUTPATIENT)
Dept: FAMILY MEDICINE CLINIC | Age: 38
End: 2023-12-08
Payer: COMMERCIAL

## 2023-12-08 VITALS
SYSTOLIC BLOOD PRESSURE: 120 MMHG | OXYGEN SATURATION: 99 % | BODY MASS INDEX: 32.27 KG/M2 | HEIGHT: 64 IN | WEIGHT: 189 LBS | HEART RATE: 66 BPM | DIASTOLIC BLOOD PRESSURE: 73 MMHG

## 2023-12-08 DIAGNOSIS — E78.2 MIXED HYPERLIPIDEMIA: ICD-10-CM

## 2023-12-08 DIAGNOSIS — G25.81 RESTLESS LEG SYNDROME: ICD-10-CM

## 2023-12-08 DIAGNOSIS — Z13.0 SCREENING FOR DEFICIENCY ANEMIA: ICD-10-CM

## 2023-12-08 DIAGNOSIS — M54.42 CHRONIC BILATERAL LOW BACK PAIN WITH BILATERAL SCIATICA: Primary | ICD-10-CM

## 2023-12-08 DIAGNOSIS — M54.41 CHRONIC BILATERAL LOW BACK PAIN WITH BILATERAL SCIATICA: Primary | ICD-10-CM

## 2023-12-08 DIAGNOSIS — E55.9 VITAMIN D DEFICIENCY: ICD-10-CM

## 2023-12-08 DIAGNOSIS — M79.604 PAIN IN BOTH LOWER EXTREMITIES: ICD-10-CM

## 2023-12-08 DIAGNOSIS — F41.9 ANXIETY: ICD-10-CM

## 2023-12-08 DIAGNOSIS — M79.605 PAIN IN BOTH LOWER EXTREMITIES: ICD-10-CM

## 2023-12-08 DIAGNOSIS — G89.29 CHRONIC BILATERAL LOW BACK PAIN WITH BILATERAL SCIATICA: Primary | ICD-10-CM

## 2023-12-08 PROBLEM — R20.2 NUMBNESS AND TINGLING IN RIGHT HAND: Status: RESOLVED | Noted: 2022-11-30 | Resolved: 2023-12-08

## 2023-12-08 PROBLEM — R20.0 NUMBNESS AND TINGLING IN RIGHT HAND: Status: RESOLVED | Noted: 2022-11-30 | Resolved: 2023-12-08

## 2023-12-08 PROCEDURE — 99214 OFFICE O/P EST MOD 30 MIN: CPT | Performed by: NURSE PRACTITIONER

## 2023-12-08 RX ORDER — ROPINIROLE 2 MG/1
2 TABLET, FILM COATED, EXTENDED RELEASE ORAL NIGHTLY
Qty: 90 TABLET | Refills: 1 | Status: SHIPPED | OUTPATIENT
Start: 2023-12-08

## 2023-12-08 RX ORDER — BUPROPION HYDROCHLORIDE 75 MG/1
TABLET ORAL
Qty: 60 TABLET | Refills: 5 | Status: SHIPPED | OUTPATIENT
Start: 2023-12-08

## 2023-12-08 RX ORDER — ERGOCALCIFEROL 1.25 MG/1
100000 CAPSULE ORAL
COMMUNITY
Start: 2023-11-10

## 2023-12-08 NOTE — ASSESSMENT & PLAN NOTE
I do feel as if this is partially due to sciatica but will check labs and further treatment pending lab results.

## 2023-12-08 NOTE — PROGRESS NOTES
"Chief Complaint  Anxiety (Follow up - med refills ) and Restless Legs Syndrome    Subjective          Latonia Vasquez presents to [facility]     Patient is a 38 yr old female that is here today to follow up regarding anxiety with occasional depression.  Weight gain on lexapro and buspirone made her \"feel drowsy and weird\".  Would like to try a different medication.  Working two jobs and raising 4 children.   Time not conducive for counseling at this time.    Will need to request   Dr mcrae visit notes- seeing patient for  thyroid nodules.  Biopsy benign-  ENT monitoring thyroid function.    Restless leg syndrome is best managed by taking two 1 mg tablets of ropinirole bedtime.  Like to continue at this dosage at this time.  Does feel some increasing complaints of leg pain during the day.  Does seem consistent with sciatica.  She is working 2 jobs now.  She is never done physical therapy or had an x-ray of her low back.  Defers anti-inflammatories or muscle relaxers at this time.    No missed menstrual cycles and does have heavy periods.  Patient is established with gynecology  Objective   Vital Signs:   Vitals:    12/08/23 1128   BP: 120/73   BP Location: Left arm   Patient Position: Sitting   Cuff Size: Large Adult   Pulse: 66   SpO2: 99%   Weight: 85.7 kg (189 lb)   Height: 162.6 cm (64\")       Wt Readings from Last 3 Encounters:   12/08/23 85.7 kg (189 lb)   03/16/23 84.4 kg (186 lb)   03/08/23 84.4 kg (186 lb)      BP Readings from Last 3 Encounters:   12/08/23 120/73   03/08/23 119/81   11/30/22 115/70       Body mass index is 32.44 kg/m².           Physical Exam  Vitals reviewed.   Constitutional:       General: She is not in acute distress.     Appearance: Normal appearance. She is well-developed. She is obese.   Neck:      Thyroid: Thyromegaly present.   Cardiovascular:      Rate and Rhythm: Normal rate and regular rhythm.      Pulses:           Posterior tibial pulses are 2+ " "on the right side and 2+ on the left side.      Heart sounds: Normal heart sounds.   Pulmonary:      Effort: Pulmonary effort is normal.      Breath sounds: Normal breath sounds.   Musculoskeletal:      Right lower leg: No edema.      Left lower leg: No edema.   Skin:     General: Skin is warm and dry.   Neurological:      General: No focal deficit present.      Mental Status: She is alert.   Psychiatric:         Attention and Perception: Attention normal.         Mood and Affect: Mood and affect normal.         Behavior: Behavior normal.           Current Outpatient Medications:     ascorbic acid (VITAMIN C) 500 MG capsule controlled-release CR capsule, Vitamin C 500 mg oral capsule, extended release take 1 capsule by oral route once   Active, Disp: , Rfl:     vitamin D (ERGOCALCIFEROL) 1.25 MG (02323 UT) capsule capsule, Take 2 capsules by mouth Every 7 (Seven) Days., Disp: , Rfl:     buPROPion (WELLBUTRIN) 75 MG tablet, Take one half tablet twice daily then may increase to 1 tablet twice daily as tolerated., Disp: 60 tablet, Rfl: 5    rOPINIRole XL (REQUIP XL) 2 MG 24 hr tablet, Take 1 tablet by mouth Every Night., Disp: 90 tablet, Rfl: 1   Past Medical History:   Diagnosis Date    Acne     Anxiety     Macromastia     RLS (restless legs syndrome)      Allergies   Allergen Reactions    Buspirone Other (See Comments)     Drowsy \"felt weird\"    Lexapro [Escitalopram] Other (See Comments)     Increased appetite and weight               Result Review :          No Images in the past 120 days found..           Social History     Tobacco Use   Smoking Status Never   Smokeless Tobacco Never           Assessment and Plan    Diagnoses and all orders for this visit:    1. Chronic bilateral low back pain with bilateral sciatica (Primary)  Assessment & Plan:  Further treatment pending x-ray results.  Consider physical therapy.    Orders:  -     XR Spine Lumbar 2 or 3 View; Future    2. Anxiety  Assessment & Plan:  Encourage " counseling.  Trial of bupropion.  Follow-up if not improving over the next few weeks.  Report any intolerance if it were to occur.    Orders:  -     buPROPion (WELLBUTRIN) 75 MG tablet; Take one half tablet twice daily then may increase to 1 tablet twice daily as tolerated.  Dispense: 60 tablet; Refill: 5    3. Restless leg syndrome  -     rOPINIRole XL (REQUIP XL) 2 MG 24 hr tablet; Take 1 tablet by mouth Every Night.  Dispense: 90 tablet; Refill: 1    4. Vitamin D deficiency  -     Vitamin D 25 hydroxy; Future    5. Pain in both lower extremities  Assessment & Plan:  I do feel as if this is partially due to sciatica but will check labs and further treatment pending lab results.    Orders:  -     Magnesium; Future  -     Comprehensive metabolic panel; Future    6. Mixed hyperlipidemia  -     Lipid panel; Future    7. Screening for deficiency anemia  -     CBC w AUTO Differential; Future        Follow Up    No follow-ups on file.  Patient was given instructions and counseling regarding her condition or for health maintenance advice. Please see specific information pulled into the AVS if appropriate.

## 2023-12-08 NOTE — ASSESSMENT & PLAN NOTE
Encourage counseling.  Trial of bupropion.  Follow-up if not improving over the next few weeks.  Report any intolerance if it were to occur.

## 2024-01-12 ENCOUNTER — TELEPHONE (OUTPATIENT)
Dept: FAMILY MEDICINE CLINIC | Age: 39
End: 2024-01-12
Payer: COMMERCIAL

## 2024-01-12 NOTE — TELEPHONE ENCOUNTER
Patient called back and I let her know about overdue labs and xray and she said she has it on her list to do.

## 2024-03-12 DIAGNOSIS — G25.81 RESTLESS LEG SYNDROME: ICD-10-CM

## 2024-03-12 RX ORDER — ROPINIROLE 2 MG/1
2 TABLET, FILM COATED, EXTENDED RELEASE ORAL NIGHTLY
Qty: 90 TABLET | Refills: 1 | Status: SHIPPED | OUTPATIENT
Start: 2024-03-12

## 2024-04-12 DIAGNOSIS — F41.9 ANXIETY: Primary | ICD-10-CM

## 2024-04-12 RX ORDER — BUPROPION HYDROCHLORIDE 100 MG/1
100 TABLET, EXTENDED RELEASE ORAL 2 TIMES DAILY
Qty: 60 TABLET | Refills: 5 | Status: SHIPPED | OUTPATIENT
Start: 2024-04-12

## 2024-05-14 DIAGNOSIS — G25.81 RESTLESS LEG SYNDROME: ICD-10-CM

## 2024-05-14 NOTE — TELEPHONE ENCOUNTER
Left message for Latonia Vasquez to call the office.   To see where pt wants her med to go.  [facility] filled #90 on 4/8/24, [facility]  Spoke to Marnie at [facility] and pt p/u #90 Ropinerole on 4/10.  Spoke to Maryan at [facility] and pt p/u #60 on 4/24.  [facility]

## 2024-05-16 RX ORDER — ROPINIROLE 2 MG/1
2 TABLET, FILM COATED, EXTENDED RELEASE ORAL NIGHTLY
Qty: 90 TABLET | Refills: 1 | OUTPATIENT
Start: 2024-05-16

## 2024-06-04 DIAGNOSIS — G25.81 RESTLESS LEG SYNDROME: ICD-10-CM

## 2024-06-05 RX ORDER — ROPINIROLE 2 MG/1
2 TABLET, FILM COATED, EXTENDED RELEASE ORAL NIGHTLY
Qty: 90 TABLET | Refills: 0 | Status: SHIPPED | OUTPATIENT
Start: 2024-06-05

## 2024-06-14 ENCOUNTER — OFFICE VISIT (OUTPATIENT)
Dept: FAMILY MEDICINE CLINIC | Age: 39
End: 2024-06-14
Payer: COMMERCIAL

## 2024-06-14 ENCOUNTER — LAB (OUTPATIENT)
Dept: LAB | Facility: HOSPITAL | Age: 39
End: 2024-06-14
Payer: COMMERCIAL

## 2024-06-14 VITALS
HEIGHT: 64 IN | BODY MASS INDEX: 31.34 KG/M2 | OXYGEN SATURATION: 98 % | SYSTOLIC BLOOD PRESSURE: 117 MMHG | DIASTOLIC BLOOD PRESSURE: 81 MMHG | HEART RATE: 76 BPM | WEIGHT: 183.6 LBS

## 2024-06-14 DIAGNOSIS — Z13.29 THYROID DISORDER SCREEN: ICD-10-CM

## 2024-06-14 DIAGNOSIS — E55.9 VITAMIN D DEFICIENCY: ICD-10-CM

## 2024-06-14 DIAGNOSIS — Z13.0 SCREENING FOR DEFICIENCY ANEMIA: ICD-10-CM

## 2024-06-14 DIAGNOSIS — M79.604 PAIN IN BOTH LOWER EXTREMITIES: ICD-10-CM

## 2024-06-14 DIAGNOSIS — G25.81 RESTLESS LEG SYNDROME: ICD-10-CM

## 2024-06-14 DIAGNOSIS — E78.2 MIXED HYPERLIPIDEMIA: ICD-10-CM

## 2024-06-14 DIAGNOSIS — M79.605 PAIN IN BOTH LOWER EXTREMITIES: ICD-10-CM

## 2024-06-14 DIAGNOSIS — F41.9 ANXIETY: Primary | ICD-10-CM

## 2024-06-14 LAB
25(OH)D3 SERPL-MCNC: 24.2 NG/ML (ref 30–100)
ALBUMIN SERPL-MCNC: 4.2 G/DL (ref 3.5–5.2)
ALBUMIN/GLOB SERPL: 1.3 G/DL
ALP SERPL-CCNC: 82 U/L (ref 39–117)
ALT SERPL W P-5'-P-CCNC: 18 U/L (ref 1–33)
ANION GAP SERPL CALCULATED.3IONS-SCNC: 9 MMOL/L (ref 5–15)
AST SERPL-CCNC: 20 U/L (ref 1–32)
BASOPHILS # BLD AUTO: 0.02 10*3/MM3 (ref 0–0.2)
BASOPHILS NFR BLD AUTO: 0.3 % (ref 0–1.5)
BILIRUB SERPL-MCNC: 0.2 MG/DL (ref 0–1.2)
BUN SERPL-MCNC: 13 MG/DL (ref 6–20)
BUN/CREAT SERPL: 19.7 (ref 7–25)
CALCIUM SPEC-SCNC: 9.1 MG/DL (ref 8.6–10.5)
CHLORIDE SERPL-SCNC: 104 MMOL/L (ref 98–107)
CHOLEST SERPL-MCNC: 222 MG/DL (ref 0–200)
CO2 SERPL-SCNC: 23 MMOL/L (ref 22–29)
CREAT SERPL-MCNC: 0.66 MG/DL (ref 0.57–1)
DEPRECATED RDW RBC AUTO: 41.9 FL (ref 37–54)
EGFRCR SERPLBLD CKD-EPI 2021: 115.3 ML/MIN/1.73
EOSINOPHIL # BLD AUTO: 0.08 10*3/MM3 (ref 0–0.4)
EOSINOPHIL NFR BLD AUTO: 1.3 % (ref 0.3–6.2)
ERYTHROCYTE [DISTWIDTH] IN BLOOD BY AUTOMATED COUNT: 14.9 % (ref 12.3–15.4)
GLOBULIN UR ELPH-MCNC: 3.3 GM/DL
GLUCOSE SERPL-MCNC: 89 MG/DL (ref 65–99)
HCT VFR BLD AUTO: 38.2 % (ref 34–46.6)
HDLC SERPL-MCNC: 41 MG/DL (ref 40–60)
HGB BLD-MCNC: 12 G/DL (ref 12–15.9)
IMM GRANULOCYTES # BLD AUTO: 0.01 10*3/MM3 (ref 0–0.05)
IMM GRANULOCYTES NFR BLD AUTO: 0.2 % (ref 0–0.5)
LDLC SERPL CALC-MCNC: 144 MG/DL (ref 0–100)
LDLC/HDLC SERPL: 3.42 {RATIO}
LYMPHOCYTES # BLD AUTO: 2.18 10*3/MM3 (ref 0.7–3.1)
LYMPHOCYTES NFR BLD AUTO: 35.5 % (ref 19.6–45.3)
MAGNESIUM SERPL-MCNC: 2.3 MG/DL (ref 1.6–2.6)
MCH RBC QN AUTO: 24.3 PG (ref 26.6–33)
MCHC RBC AUTO-ENTMCNC: 31.4 G/DL (ref 31.5–35.7)
MCV RBC AUTO: 77.3 FL (ref 79–97)
MONOCYTES # BLD AUTO: 0.39 10*3/MM3 (ref 0.1–0.9)
MONOCYTES NFR BLD AUTO: 6.4 % (ref 5–12)
NEUTROPHILS NFR BLD AUTO: 3.46 10*3/MM3 (ref 1.7–7)
NEUTROPHILS NFR BLD AUTO: 56.3 % (ref 42.7–76)
PLATELET # BLD AUTO: 252 10*3/MM3 (ref 140–450)
PMV BLD AUTO: 12.2 FL (ref 6–12)
POTASSIUM SERPL-SCNC: 4 MMOL/L (ref 3.5–5.2)
PROT SERPL-MCNC: 7.5 G/DL (ref 6–8.5)
RBC # BLD AUTO: 4.94 10*6/MM3 (ref 3.77–5.28)
SODIUM SERPL-SCNC: 136 MMOL/L (ref 136–145)
TRIGL SERPL-MCNC: 203 MG/DL (ref 0–150)
TSH SERPL DL<=0.05 MIU/L-ACNC: 1.05 UIU/ML (ref 0.27–4.2)
VLDLC SERPL-MCNC: 37 MG/DL (ref 5–40)
WBC NRBC COR # BLD AUTO: 6.14 10*3/MM3 (ref 3.4–10.8)

## 2024-06-14 PROCEDURE — 82306 VITAMIN D 25 HYDROXY: CPT

## 2024-06-14 PROCEDURE — 36415 COLL VENOUS BLD VENIPUNCTURE: CPT

## 2024-06-14 PROCEDURE — 99214 OFFICE O/P EST MOD 30 MIN: CPT | Performed by: NURSE PRACTITIONER

## 2024-06-14 PROCEDURE — 80050 GENERAL HEALTH PANEL: CPT

## 2024-06-14 PROCEDURE — 80061 LIPID PANEL: CPT

## 2024-06-14 PROCEDURE — 83735 ASSAY OF MAGNESIUM: CPT

## 2024-06-14 RX ORDER — BUPROPION HYDROCHLORIDE 100 MG/1
100 TABLET, EXTENDED RELEASE ORAL 2 TIMES DAILY
Qty: 60 TABLET | Refills: 5 | Status: SHIPPED | OUTPATIENT
Start: 2024-06-14

## 2024-06-14 RX ORDER — ROPINIROLE 2 MG/1
2 TABLET, FILM COATED, EXTENDED RELEASE ORAL NIGHTLY
Qty: 90 TABLET | Refills: 1 | Status: SHIPPED | OUTPATIENT
Start: 2024-06-14

## 2024-06-14 NOTE — PROGRESS NOTES
"Chief Complaint  Anxiety    Subjective          Latonia Vasquez presents to [facility]     Patient is a 38-year-old female who is here today to follow-up regarding anxiety.  Due to change in pharmacy between [facility] and [facility] did not get Wellbutrin 100 mg twice per day.  Symptoms had improved on Wellbutrin 75 mg twice per day originally but not currently taking any dose of Wellbutrin.  Would like to have Wellbutrin 100 mg twice per day sent to [facility] today.    Restless leg syndrome symptoms are stable on ropinirole extended release 2 mg at bedtime.  This version is a little more expensive and patient is not sure if it is due to which pharmacy it is dispensed from, 30-day versus 90-day quantity, or extended a regular release but will confer with pharmacist.  She denies medication side effects.  Patient has not yet had a chance to obtain labs including magnesium level and follow-up vitamin D level or x-ray of the low back.  She is aware that any of these could be contributors to leg pain.  She states she will get labs done while she is here today since she is fasting.    History of tubal ligation.  Last menstrual cycle within 3 to 4 weeks ago.  Does have heavy periods and will discuss at upcoming appointment with gynecology.    Thyroid enlargement is currently being monitored with ENT, Dr. Gardner.     Objective   Vital Signs:   Vitals:    06/14/24 1129   BP: 117/81   BP Location: Right arm   Patient Position: Sitting   Pulse: 76   SpO2: 98%   Weight: 83.3 kg (183 lb 9.6 oz)   Height: 162.6 cm (64.02\")       Wt Readings from Last 3 Encounters:   06/14/24 83.3 kg (183 lb 9.6 oz)   12/08/23 85.7 kg (189 lb)   03/16/23 84.4 kg (186 lb)      BP Readings from Last 3 Encounters:   06/14/24 117/81   12/08/23 120/73   03/08/23 119/81       Body mass index is 31.5 kg/m².           Physical Exam  Vitals reviewed.   Constitutional:       General: She is not in acute distress.     Appearance: Normal " "appearance. She is well-developed.   Neck:      Comments: Mild thyromegaly  Cardiovascular:      Rate and Rhythm: Normal rate and regular rhythm.      Pulses:           Posterior tibial pulses are 2+ on the right side and 2+ on the left side.      Heart sounds: Normal heart sounds.   Pulmonary:      Effort: Pulmonary effort is normal.      Breath sounds: Normal breath sounds.   Musculoskeletal:      Right lower leg: No edema.      Left lower leg: No edema.   Skin:     General: Skin is warm and dry.   Neurological:      General: No focal deficit present.      Mental Status: She is alert.   Psychiatric:         Attention and Perception: Attention normal.         Mood and Affect: Mood and affect normal.         Behavior: Behavior normal.           Current Outpatient Medications:     ascorbic acid (VITAMIN C) 500 MG capsule controlled-release CR capsule, Vitamin C 500 mg oral capsule, extended release take 1 capsule by oral route once   Active, Disp: , Rfl:     buPROPion SR (Wellbutrin SR) 100 MG 12 hr tablet, Take 1 tablet by mouth 2 (Two) Times a Day., Disp: 60 tablet, Rfl: 5    rOPINIRole XL (REQUIP XL) 2 MG 24 hr tablet, Take 1 tablet by mouth Every Night., Disp: 90 tablet, Rfl: 1   Past Medical History:   Diagnosis Date    Acne     Anxiety     Macromastia     RLS (restless legs syndrome)      Allergies   Allergen Reactions    Buspirone Other (See Comments)     Drowsy \"felt weird\"    Lexapro [Escitalopram] Other (See Comments)     Increased appetite and weight               Result Review :          No Images in the past 120 days found..           Social History     Tobacco Use   Smoking Status Never   Smokeless Tobacco Never           Assessment and Plan    Diagnoses and all orders for this visit:    1. Anxiety (Primary)  Assessment & Plan:  To restart Wellbutrin but at the 100 mg twice daily dose.  Patient states she will probably start at 100 mg daily and work up to twice daily if she can tolerate.  Follow-up if " symptoms or not controlled.  Otherwise follow-up in 6 months or sooner if concerns.  Further treatment recommendations pending lab results.    Orders:  -     buPROPion SR (Wellbutrin SR) 100 MG 12 hr tablet; Take 1 tablet by mouth 2 (Two) Times a Day.  Dispense: 60 tablet; Refill: 5    2. Restless leg syndrome  -     rOPINIRole XL (REQUIP XL) 2 MG 24 hr tablet; Take 1 tablet by mouth Every Night.  Dispense: 90 tablet; Refill: 1    3. Vitamin D deficiency  -     Vitamin D 25 hydroxy; Future    4. Mixed hyperlipidemia  -     Lipid panel; Future  -     Comprehensive metabolic panel; Future    5. Screening for deficiency anemia  -     CBC w AUTO Differential; Future    6. Thyroid disorder screen  -     TSH Rfx On Abnormal To Free T4; Future    7. Pain in both lower extremities  -     Magnesium; Future        Follow Up    Return in about 6 months (around 12/14/2024).  Patient was given instructions and counseling regarding her condition or for health maintenance advice. Please see specific information pulled into the AVS if appropriate.

## 2024-06-14 NOTE — ASSESSMENT & PLAN NOTE
To restart Wellbutrin but at the 100 mg twice daily dose.  Patient states she will probably start at 100 mg daily and work up to twice daily if she can tolerate.  Follow-up if symptoms or not controlled.  Otherwise follow-up in 6 months or sooner if concerns.  Further treatment recommendations pending lab results.

## 2024-06-19 NOTE — PROGRESS NOTES
Vitamin D levels mildly low.  Recommend vitamin D supplement 1000 international units daily indefinitely.  Cholesterol is a little more elevated.  Decrease intake of saturated fat and cholesterol and increase exercise such as walking.  You could also take an over-the-counter psyllium fiber supplement such as Metamucil to help mildly lower cholesterol levels.  I suggest repeating a fasting lipid panel within 3 months as follow-up and recheck if you are agreeable.  You are not anemic.  Blood sugar, kidney, liver, thyroid function, and magnesium level are normal.

## 2024-07-23 ENCOUNTER — TELEPHONE (OUTPATIENT)
Dept: OBSTETRICS AND GYNECOLOGY | Facility: CLINIC | Age: 39
End: 2024-07-23

## 2024-07-23 NOTE — TELEPHONE ENCOUNTER
UNABLE TO WARM TRANSFER    LAYO BEST    [phone]    PT STATES SHE IS A DR SIMON PT     WAS LAST SEEN 9/7/22 BY DR BARNES     WOULD LIKE TO SEE DR SIMON TO DISCUSS POSSIBLE ABLATION OR HYSTERECTOMY

## 2024-08-26 NOTE — PROGRESS NOTES
"GYN Visit    Chief Complaint   Patient presents with    AUB    Menstrual Problem     Discuss surgery       HPI:   38 y.o. with LMP 2024 status post tubal ligation here for menorrhagia.  Patient states menses monthly x 9 days with 4 to 5 days are heavy using overnight pads changing 6-7 times a day with overflow+ clotting+ cramping patient interested in considering ablation.  Patient with history of 4 previous C-sections.  She had a sterilization procedure with her last .  Patient with history of adhesions noted at previous .      History: PMHx, Meds, Allergies, PSHx, Social Hx, and POBHx all reviewed and updated.    PHYSICAL EXAM:  /83   Pulse 86   Ht 162.6 cm (64.02\")   Wt 83.5 kg (184 lb)   LMP 2024 Comment: Heavy for 3 days, bleeding through tampons, lasting about 7 days, currently on menses  Breastfeeding No   BMI 31.56 kg/m²   General- NAD, alert and oriented, appropriate  Psych- Normal mood, good memory          CBC w AUTO Differential (2024 12:11)          TSH Rfx On Abnormal To Free T4 (2024 12:11)    ASSESSMENT AND PLAN:  Diagnoses and all orders for this visit:    1. Menometrorrhagia (Primary)  -     US Non-ob Transvaginal; Future    2. Hx of  section    3. Pelvic adhesive disease    We discussed the management of menometrorrhagia.  We discussed hysterectomy, endometrial ablation, medications and pros and cons of the above-named procedures.  If patient is leaning towards hysterectomy I suggested possible diagnostic laparoscopy to determine the extent of the adhesive disease to determine whether this is something that can be performed successfully laparoscopically or will she more likely need an open procedure versus referral to minimally invasive surgeon in [place].  We going to set her up for pelvic ultrasound and if she is interested endometrial ablation we will perform endometrial biopsy at the visit we reviewed the results of her " ultrasound.  The patient was given an opportunity ask questions and her questions were answered to her satisfaction.        Follow Up:  Return for please sched sono i have placed order and f/u w/me after.          Zuleima Marie DO  08/28/2024    [facility]  [facility] [place] [address]  Dept: [phone]  Dept Fax: [phone]  Loc: [phone]  Loc Fax: [phone]

## 2024-08-28 ENCOUNTER — OFFICE VISIT (OUTPATIENT)
Dept: OBSTETRICS AND GYNECOLOGY | Facility: CLINIC | Age: 39
End: 2024-08-28
Payer: COMMERCIAL

## 2024-08-28 ENCOUNTER — TELEPHONE (OUTPATIENT)
Dept: OBSTETRICS AND GYNECOLOGY | Facility: CLINIC | Age: 39
End: 2024-08-28

## 2024-08-28 VITALS
HEART RATE: 86 BPM | HEIGHT: 64 IN | SYSTOLIC BLOOD PRESSURE: 126 MMHG | WEIGHT: 184 LBS | BODY MASS INDEX: 31.41 KG/M2 | DIASTOLIC BLOOD PRESSURE: 83 MMHG

## 2024-08-28 DIAGNOSIS — N73.6 PELVIC ADHESIVE DISEASE: ICD-10-CM

## 2024-08-28 DIAGNOSIS — N92.1 MENOMETRORRHAGIA: Primary | ICD-10-CM

## 2024-08-28 DIAGNOSIS — Z98.891 HX OF CESAREAN SECTION: ICD-10-CM

## 2024-08-28 PROCEDURE — 99214 OFFICE O/P EST MOD 30 MIN: CPT | Performed by: OBSTETRICS & GYNECOLOGY

## 2024-09-25 ENCOUNTER — HOSPITAL ENCOUNTER (OUTPATIENT)
Dept: ULTRASOUND IMAGING | Facility: HOSPITAL | Age: 39
Discharge: HOME | End: 2024-09-25
Admitting: OBSTETRICS & GYNECOLOGY
Payer: COMMERCIAL

## 2024-09-25 DIAGNOSIS — N92.1 MENOMETRORRHAGIA: ICD-10-CM

## 2024-09-25 PROCEDURE — 76830 TRANSVAGINAL US NON-OB: CPT

## 2024-10-01 ENCOUNTER — TELEPHONE (OUTPATIENT)
Dept: OBSTETRICS AND GYNECOLOGY | Facility: CLINIC | Age: 39
End: 2024-10-01

## 2024-10-01 NOTE — PROGRESS NOTES
"GYN Visit    Chief Complaint   Patient presents with    Follow-up     FUUS and lab work       HPI:   38 y.o. status post tubal ligation here to review results of ultrasound.  Patient previously seen in August complaint of menorrhagia.  Patient is interested in endometrial ablation if possible.    History: PMHx, Meds, Allergies, PSHx, Social Hx, and POBHx all reviewed and updated.    PHYSICAL EXAM:  /86   Pulse 78   Ht 162.6 cm (64.02\")   Wt 81.6 kg (180 lb)   LMP 2024 (Exact Date)   BMI 30.88 kg/m²   General- NAD, alert and oriented, appropriate  Psych- Normal mood, good memory      External genitalia- Normal female, no lesions  Urethra/meatus- Normal, no masses, non tender, no prolapse  Bladder- Normal, no masses, non tender, no prolapse  Vagina- Normal, no atrophy, no lesions, no discharge, no prolapse  Cvx- Normal, no lesions, no discharge, No cervical motion tenderness  Uterus-  8 to 10 weeks size, Enlarged  Adnexa- No mass, non tender  Anus/Rectum/Perineum- Not performed  Endometrial Biopsy    Date/Time: 10/2/2024 3:24 PM    Performed by: Zuleima Marie DO  Authorized by: Zuleima Marie DO    Consent:     Consent obtained: written    Consent given by: patient    Risks discussed: bleeding    Patient agrees, verbalizes understanding, and wants to proceed: yes    Indications:     Indications: abnormal uterine bleeding    Pre-procedure:     Urine pregnancy test: negative    Procedure:     A bimanual exam was performed: yes      Uterus size: 9-10 weeks    Uterus position: midposition    Prepped with: Betadine    Tenaculum used: yes      A local block was performed: no      A local block was performed comment: Anterior lip of the cervix was sprayed with HurriCaine spray for anesthesia.    Cervix dilated: yes      Number of passes: 2  Findings:     Cervix: normal      Uterus depth by sound (cm): 10    Specimen collected: specimen collected and sent to pathology      Patient tolerance: " tolerated well, no immediate complications       US Non-ob Transvaginal (2024 15:46)    ASSESSMENT AND PLAN:  Diagnoses and all orders for this visit:    1. Menorrhagia with regular cycle  -     Case Request; Standing  -     Case Request  -     Tissue Pathology Exam    2. History of 3  sections  -     Case Request; Standing  -     Case Request    Other orders  -     Endometrial Biopsy  -     Follow Anesthesia Guidelines / Protocol; Standing  -     Verify / Perform Chlorhexidine Skin Prep; Standing  -     Place Sequential Compression Device; Standing  -     Maintain Sequential Compression Device; Standing  -     Obtain Informed Consent; Standing  -     CBC & Differential; Standing  -     ceFAZolin (ANCEF) 2,000 mg in sodium chloride 0.9 % 100 mL IVPB    Diagnostic laparoscopy, hysteroscopy d&c endometrial ablation: The patient was counseled to the risks and benefits of this procedure to include infection, bleeding, damage to internal organs, bowels, bladder, blood vessels, other internal organs requiring additional surgery to repair or remove any damaged structures.  Risks and benefits of surgery are discussed with patient to include infection, bleeding, damage to internal organs, bowels, bladder, other internal organs requiring additional surgery to repair or remove any damaged structures. Possible eumenorrhea, hypomenorrhea and failure to correct bleeding.  Possible cyclic pain despite amenorrhea and need for additional surgery to include possible hysterectomy. Patient is to have complications that occur that are either not recognized or occur in the postoperative period may have a prolonged recovery period and sometimes an incomplete recovery.  The patient was given the opportunity to ask questions and her questions were answered to her satisfaction.         Follow Up:  No follow-ups on file.          Zuleima Marie DO  10/02/2024    [facility]  [address] [place] [address]  Dept: [phone]  Dept Fax: [phone]  Loc: [phone]  Loc Fax: [phone]

## 2024-10-01 NOTE — TELEPHONE ENCOUNTER
Hub staff attempted to follow warm transfer process and was unsuccessful     Caller: Latonia Vasquez    Relationship to patient: Self    Best call back number: [phone]      Patient is needing: PT WOULD LIKE TO KNOW IF THERE ARE ANY OPENINGS FOR TODAY, SHE IS OFF WORK TODAY

## 2024-10-02 ENCOUNTER — OFFICE VISIT (OUTPATIENT)
Dept: OBSTETRICS AND GYNECOLOGY | Facility: CLINIC | Age: 39
End: 2024-10-02
Payer: COMMERCIAL

## 2024-10-02 VITALS
DIASTOLIC BLOOD PRESSURE: 86 MMHG | HEART RATE: 78 BPM | BODY MASS INDEX: 30.73 KG/M2 | WEIGHT: 180 LBS | SYSTOLIC BLOOD PRESSURE: 128 MMHG | HEIGHT: 64 IN

## 2024-10-02 DIAGNOSIS — N92.0 MENORRHAGIA WITH REGULAR CYCLE: ICD-10-CM

## 2024-10-02 DIAGNOSIS — Z98.891 HISTORY OF 3 CESAREAN SECTIONS: ICD-10-CM

## 2024-10-02 PROCEDURE — 88305 TISSUE EXAM BY PATHOLOGIST: CPT | Performed by: OBSTETRICS & GYNECOLOGY

## 2024-10-04 LAB
CYTO UR: NORMAL
LAB AP CASE REPORT: NORMAL
LAB AP CLINICAL INFORMATION: NORMAL
PATH REPORT.FINAL DX SPEC: NORMAL
PATH REPORT.GROSS SPEC: NORMAL

## 2024-10-29 DIAGNOSIS — G25.81 RESTLESS LEG SYNDROME: ICD-10-CM

## 2024-10-29 DIAGNOSIS — G25.81 RESTLESS LEG SYNDROME: Primary | ICD-10-CM

## 2024-10-29 RX ORDER — ROPINIROLE 4 MG/1
4 TABLET, FILM COATED, EXTENDED RELEASE ORAL NIGHTLY
Qty: 90 TABLET | Refills: 0 | Status: SHIPPED | OUTPATIENT
Start: 2024-10-29

## 2024-10-29 RX ORDER — ROPINIROLE 2 MG/1
2 TABLET, FILM COATED, EXTENDED RELEASE ORAL NIGHTLY
Qty: 90 TABLET | Refills: 1 | Status: SHIPPED | OUTPATIENT
Start: 2024-10-29

## 2024-12-06 PROBLEM — Z98.891 HISTORY OF 3 CESAREAN SECTIONS: Status: ACTIVE | Noted: 2024-10-02

## 2024-12-06 PROBLEM — N92.0 MENORRHAGIA WITH REGULAR CYCLE: Status: ACTIVE | Noted: 2024-10-02

## 2024-12-11 ENCOUNTER — OFFICE VISIT (OUTPATIENT)
Dept: FAMILY MEDICINE CLINIC | Age: 39
End: 2024-12-11
Payer: COMMERCIAL

## 2024-12-11 ENCOUNTER — HOSPITAL ENCOUNTER (OUTPATIENT)
Dept: GENERAL RADIOLOGY | Facility: HOSPITAL | Age: 39
Discharge: HOME | End: 2024-12-11
Admitting: NURSE PRACTITIONER
Payer: COMMERCIAL

## 2024-12-11 VITALS
BODY MASS INDEX: 31.41 KG/M2 | TEMPERATURE: 98.1 F | WEIGHT: 184 LBS | SYSTOLIC BLOOD PRESSURE: 118 MMHG | HEIGHT: 64 IN | DIASTOLIC BLOOD PRESSURE: 94 MMHG | OXYGEN SATURATION: 100 % | HEART RATE: 98 BPM

## 2024-12-11 DIAGNOSIS — M54.42 CHRONIC BILATERAL LOW BACK PAIN WITH BILATERAL SCIATICA: ICD-10-CM

## 2024-12-11 DIAGNOSIS — G25.81 RESTLESS LEG SYNDROME: ICD-10-CM

## 2024-12-11 DIAGNOSIS — M54.41 CHRONIC BILATERAL LOW BACK PAIN WITH BILATERAL SCIATICA: ICD-10-CM

## 2024-12-11 DIAGNOSIS — E55.9 VITAMIN D DEFICIENCY: Primary | ICD-10-CM

## 2024-12-11 DIAGNOSIS — M25.512 ACUTE PAIN OF LEFT SHOULDER: ICD-10-CM

## 2024-12-11 DIAGNOSIS — E78.2 MIXED HYPERLIPIDEMIA: ICD-10-CM

## 2024-12-11 DIAGNOSIS — G89.29 CHRONIC BILATERAL LOW BACK PAIN WITH BILATERAL SCIATICA: ICD-10-CM

## 2024-12-11 DIAGNOSIS — F41.9 ANXIETY: ICD-10-CM

## 2024-12-11 PROCEDURE — 72100 X-RAY EXAM L-S SPINE 2/3 VWS: CPT

## 2024-12-11 PROCEDURE — 99214 OFFICE O/P EST MOD 30 MIN: CPT | Performed by: NURSE PRACTITIONER

## 2024-12-11 RX ORDER — MELOXICAM 7.5 MG/1
7.5 TABLET ORAL DAILY PRN
Qty: 30 TABLET | Refills: 0 | Status: SHIPPED | OUTPATIENT
Start: 2024-12-11 | End: 2025-01-10

## 2024-12-11 RX ORDER — ROPINIROLE 4 MG/1
4 TABLET, FILM COATED, EXTENDED RELEASE ORAL NIGHTLY
Qty: 90 TABLET | Refills: 1 | Status: SHIPPED | OUTPATIENT
Start: 2024-12-11 | End: 2024-12-13 | Stop reason: SDUPTHER

## 2024-12-11 RX ORDER — BUPROPION HYDROCHLORIDE 100 MG/1
100 TABLET, EXTENDED RELEASE ORAL 2 TIMES DAILY
Qty: 180 TABLET | Refills: 1 | Status: SHIPPED | OUTPATIENT
Start: 2024-12-11

## 2024-12-11 NOTE — PROGRESS NOTES
"Chief Complaint  Restless Legs Syndrome    Subjective          Latonia Vasquez presents to [facility]     Patient is 39-year-old female who does report some anxiety and did not get Wellbutrin 100 mg twice per day from the pharmacy.  She thinks it may have been sent to a different pharmacy and did not .  She is still wanting to try Wellbutrin 100 mg twice per day for management of anxiety.    Also was unable to get ropinirole 4 mg tablets to take at bedtime for restless leg symptoms.  She symptoms feels symptoms during the day if she sitting in the car for longer period of time.  She does have some chronic low back pain and wonders if it could be a nerve related issue or need to a nerve conduction study.  She had previously deferred a low back x-ray and denies neuropathic type symptoms such as numbness or tingling or burning pain radiating into the lower extremities but does have concerns that restless leg symptoms have persisted.  Blood count was checked and she is not anemic.  Would like to try the ropinirole 4 mg at bedtime consistently to see if it would help ease her symptoms.  She has never had a sleep study.    Patient also reports she has had some left shoulder pain intermittently but exhibits full range of motion on exam.  She denies any fall or injury.  Defers shoulder x-ray today.     Objective   Vital Signs:   Vitals:    12/11/24 0812   BP: 118/94   BP Location: Left arm   Patient Position: Sitting   Pulse: 98   Temp: 98.1 °F (36.7 °C)   TempSrc: Oral   SpO2: 100%   Weight: 83.5 kg (184 lb)   Height: 162.6 cm (64.02\")       Wt Readings from Last 3 Encounters:   12/11/24 83.5 kg (184 lb)   10/02/24 81.6 kg (180 lb)   08/28/24 83.5 kg (184 lb)      BP Readings from Last 3 Encounters:   12/11/24 118/94   10/02/24 128/86   08/28/24 126/83       Body mass index is 31.56 kg/m².           Physical Exam  Vitals reviewed.   Constitutional:       General: She is not in acute " "distress.     Appearance: Normal appearance. She is well-developed.   Cardiovascular:      Rate and Rhythm: Normal rate and regular rhythm.      Heart sounds: Normal heart sounds.   Pulmonary:      Effort: Pulmonary effort is normal.      Breath sounds: Normal breath sounds.   Musculoskeletal:      Right lower leg: No edema.      Left lower leg: No edema.   Skin:     General: Skin is warm and dry.   Neurological:      General: No focal deficit present.      Mental Status: She is alert.   Psychiatric:         Attention and Perception: Attention normal.         Mood and Affect: Mood and affect normal.         Behavior: Behavior normal.           Current Outpatient Medications:     rOPINIRole XL (REQUIP XL) 4 MG 24 hr tablet, Take 1 tablet by mouth Every Night., Disp: 90 tablet, Rfl: 1    buPROPion SR (Wellbutrin SR) 100 MG 12 hr tablet, Take 1 tablet by mouth 2 (Two) Times a Day., Disp: 180 tablet, Rfl: 1    meloxicam (MOBIC) 7.5 MG tablet, Take 1 tablet by mouth Daily As Needed for Moderate Pain for up to 30 days., Disp: 30 tablet, Rfl: 0   Past Medical History:   Diagnosis Date    Acne     Anxiety     Macromastia     RLS (restless legs syndrome)      Allergies   Allergen Reactions    Buspirone Other (See Comments)     Drowsy \"felt weird\"    Lexapro [Escitalopram] Other (See Comments)     Increased appetite and weight               Result Review :     Common labs          6/14/2024    12:11   Common Labs   Glucose 89    BUN 13    Creatinine 0.66    Sodium 136    Potassium 4.0    Chloride 104    Calcium 9.1    Albumin 4.2    Total Bilirubin 0.2    Alkaline Phosphatase 82    AST (SGOT) 20    ALT (SGPT) 18    WBC 6.14    Hemoglobin 12.0    Hematocrit 38.2    Platelets 252    Total Cholesterol 222    Triglycerides 203    HDL Cholesterol 41    LDL Cholesterol  144         US Non-ob Transvaginal    Result Date: 9/26/2024  Impression: Unremarkable pelvic ultrasound Electronically Signed: Royer Ayobu MD  9/26/2024 10:24 " GURPREET EDT  Workstation ID: [id number]             Social History     Tobacco Use   Smoking Status Never   Smokeless Tobacco Never           Assessment and Plan    Diagnoses and all orders for this visit:    1. Vitamin D deficiency (Primary)  Assessment & Plan:  Commend vitamin D 1000 international units daily over-the-counter indefinitely.      2. Restless leg syndrome  Assessment & Plan:  Try the higher dose of ropinirole 4 mg at bedtime consistently and follow-up if not improving.    Orders:  -     rOPINIRole XL (REQUIP XL) 4 MG 24 hr tablet; Take 1 tablet by mouth Every Night.  Dispense: 90 tablet; Refill: 1    3. Anxiety  -     buPROPion SR (Wellbutrin SR) 100 MG 12 hr tablet; Take 1 tablet by mouth 2 (Two) Times a Day.  Dispense: 180 tablet; Refill: 1    4. Mixed hyperlipidemia  Assessment & Plan:  Recommend decreasing intake of cholesterol and saturated fat and increase exercise.  Could take red yeast rice with coenzyme every 10 over-the-counter or over-the-counter psyllium fiber supplement.  Recommend repeating labs fasting in 2 months as follow-up.      5. Chronic bilateral low back pain with bilateral sciatica  Assessment & Plan:  Has taken anti-inflammatories and muscle relaxers previously.  Currently doing stretches and exercises from demonstration videos online.  Further treatment recommendations pending x-ray results.    Orders:  -     XR Spine Lumbar 2 or 3 View; Future  -     meloxicam (MOBIC) 7.5 MG tablet; Take 1 tablet by mouth Daily As Needed for Moderate Pain for up to 30 days.  Dispense: 30 tablet; Refill: 0    6. Acute pain of left shoulder  Assessment & Plan:  Okay to apply 10-15 intervals of heat or cold.  Consider x-ray if not improving.  Defers x-ray today.    Orders:  -     meloxicam (MOBIC) 7.5 MG tablet; Take 1 tablet by mouth Daily As Needed for Moderate Pain for up to 30 days.  Dispense: 30 tablet; Refill: 0        Follow Up    No follow-ups on file.  Patient was given instructions and  counseling regarding her condition or for health maintenance advice. Please see specific information pulled into the AVS if appropriate.

## 2024-12-11 NOTE — ASSESSMENT & PLAN NOTE
Has taken anti-inflammatories and muscle relaxers previously.  Currently doing stretches and exercises from demonstration videos online.  Further treatment recommendations pending x-ray results.

## 2024-12-11 NOTE — ASSESSMENT & PLAN NOTE
Recommend decreasing intake of cholesterol and saturated fat and increase exercise.  Could take red yeast rice with coenzyme every 10 over-the-counter or over-the-counter psyllium fiber supplement.  Recommend repeating labs fasting in 2 months as follow-up.

## 2024-12-11 NOTE — ASSESSMENT & PLAN NOTE
Okay to apply 10-15 intervals of heat or cold.  Consider x-ray if not improving.  Defers x-ray today.

## 2024-12-13 DIAGNOSIS — G25.81 RESTLESS LEG SYNDROME: ICD-10-CM

## 2024-12-13 RX ORDER — ROPINIROLE 4 MG/1
4 TABLET, FILM COATED, EXTENDED RELEASE ORAL NIGHTLY
Qty: 30 TABLET | Refills: 1 | Status: SHIPPED | OUTPATIENT
Start: 2024-12-13

## 2024-12-15 NOTE — PROGRESS NOTES
Minimal arthritis and scoliosis.  If not improving with current treatment, consider referral to physical therapy and possible MRI.

## 2025-01-31 RX ORDER — MELOXICAM 7.5 MG/1
7.5 TABLET ORAL DAILY
COMMUNITY

## 2025-01-31 NOTE — PRE-PROCEDURE INSTRUCTIONS
PATIENT INSTRUCTED TO BE:    - NOTHING TO EAT AFTER MIDNIGHT OR CHEW, EXCEPT CAN HAVE CLEAR LIQUIDS 2 HOURS PRIOR TO SURGERY ARRIVAL TIME , NO MORE THAN 8 OZ. (NOTHING RED)     - TO HOLD ALL VITAMINS, SUPPLEMENTS, NSAIDS FOR ONE WEEK PRIOR TO THEIR SURGICAL PROCEDURE        - BATHING INSTRUCTIONS GIVEN    INSTRUCTED NO LOTIONS, JEWELRY, PIERCINGS,  NAIL POLISH, OR DEODORANT DAY OF SURGERY        -INSTRUCTED TO TAKE THE FOLLOWING MEDICATIONS THE DAY OF SURGERY WITH SIPS OF WATER:   Wellbutrin        - DO NOT BRING ANY MEDICATIONS WITH YOU TO THE HOSPITAL THE DAY OF SURGERY, EXCEPT IF USE INHALERS. BRING INHALERS DAY OF SURGERY       - BRING CPAP OR BIPAP TO THE HOSPITAL ONLY IF YOU ARE SPENDING THE NIGHT    - DO NOT SMOKE OR VAPE 24 HOURS PRIOR TO PROCEDURE PER ANESTHESIA REQUEST     -MAKE SURE YOU HAVE A RIDE HOME OR SOMEONE TO STAY WITH YOU THE DAY OF THE PROCEDURE AFTER YOU GO HOME     - FOLLOW ANY OTHER INSTRUCTIONS GIVEN TO YOU BY YOUR SURGEON'S OFFICE.     COME TO [facility]/ [facility], FIRST FLOOR. CHECK IN AT THE DESK FOR REGISTRATION/SURGERY    - YOU WILL RECEIVE A PHONE CALL THE DAY PRIOR TO SURGERY BETWEEN 1PM AND 4 PM WITH ARRIVAL TIME, IF YOUR SURGERY IS ON A MONDAY YOU WILL RECEIVE A CALL THE FRIDAY PRIOR TO SURGERY DATE    - BRING CASH OR CREDIT CARD FOR COPAYMENT OF MEDICATIONS AFTER SURGERY IF YOU USE THE HOSPITAL PHARMACY (MEDS TO BED)    - PREADMISSION TESTING NURSE ELSA CHEN [phone] IF HAVE ANY QUESTIONS     -PATIENT PROVIDED THE NUMBER FOR PREOP SURGICAL DEPT IF HAD QUESTIONS AFTER HOURS PRIOR TO SURGERY ( [phone]).  INFORMED PT IF NO ANSWER, LEAVE A MESSAGE AND SOMEONE WILL RETURN THEIR CALL       PATIENT VERBALIZED UNDERSTANDING

## 2025-02-03 PROCEDURE — S0260 H&P FOR SURGERY: HCPCS | Performed by: OBSTETRICS & GYNECOLOGY

## 2025-02-03 NOTE — H&P
[facility]   HISTORY AND PHYSICAL    Patient Name:Latonia Vasquez  : 1985  MRN: [id number]  Primary Care Physician: Josephine Dean APRN  Date of admission: (Not on file)    Subjective   Subjective     Chief Complaint: here for surgery    History of Present Illness   Latonia Vasquez is a 39 y.o. female  with LMP 2025 status post tubal ligation here for diagnostic laparoscopy, hysteroscopy D&C with endometrial ablation due to menorrhagia.  Patient states menses monthly x 9 days with 4 to 5 days are heavy using overnight pads changing 6-7 times a day with overflow+ clotting+ cramping patient interested in having an ablation.  Patient with history of 4 previous C-sections.  She had a sterilization procedure with her last .  Patient with history of adhesions noted at previous .     Review of Systems   Constitutional: Negative.    HENT: Negative.     Eyes: Negative.    Respiratory: Negative.     Cardiovascular: Negative.    Gastrointestinal: Negative.    Endocrine: Negative.    Genitourinary:  Positive for menstrual problem.   Musculoskeletal: Negative.    Skin: Negative.    Allergic/Immunologic: Negative.    Neurological: Negative.    Hematological: Negative.    Psychiatric/Behavioral: Negative.           Personal History     Past Medical History:   Diagnosis Date    Acne     Anxiety     PONV (postoperative nausea and vomiting)     RLS (restless legs syndrome)     Thyroid nodule        Past Surgical History:   Procedure Laterality Date     SECTION  2020, ,      SECTION WITH TUBAL  2020    DILATATION AND CURETTAGE  2019    REDUCTION MAMMAPLASTY  2020    WISDOM TOOTH EXTRACTION         Family History: Her family history includes Heart disease in her mother; Hypertension in her mother.     Social History: She  reports that she has never smoked. She has never used smokeless tobacco. She reports current alcohol use. She reports that she does  not use drugs.    Home Medications:  buPROPion SR, meloxicam, and rOPINIRole XL    Allergies:  She is allergic to buspirone and lexapro [escitalopram].    Objective    Objective     Vitals:         Physical Exam  Vitals and nursing note reviewed. Exam conducted with a chaperone present.   Constitutional:       Appearance: Normal appearance.   HENT:      Head: Normocephalic.   Cardiovascular:      Rate and Rhythm: Normal rate and regular rhythm.      Pulses: Normal pulses.      Heart sounds: Normal heart sounds.   Pulmonary:      Effort: Pulmonary effort is normal.      Breath sounds: Normal breath sounds.   Abdominal:      General: Bowel sounds are normal.      Palpations: Abdomen is soft.      Tenderness: There is no rebound.      Hernia: No hernia is present.   Genitourinary:     Comments: Nml female external genitalia.  Cervix is parous. Uterus axial normal size shape and consistency.  No adnexal masses are appreciated   Musculoskeletal:         General: Normal range of motion.      Cervical back: Normal range of motion.   Skin:     General: Skin is warm and dry.   Neurological:      General: No focal deficit present.      Mental Status: She is alert and oriented to person, place, and time.   Psychiatric:         Mood and Affect: Mood normal.         Behavior: Behavior normal.          Result Review    Result Review:  I have personally reviewed the results from the time of this admission to 2/3/2025 18:00 EST and agree with these findings:  []  Laboratory list / accordion  []  Microbiology  []  Radiology  []  EKG/Telemetry   []  Cardiology/Vascular   []  Pathology  []  Old records  []  Other:  Most notable findings include:  EMB: disordered proliferative endometriuam      Assessment & Plan   Assessment / Plan     Brief Patient Summary:  Latonia Vasquez is a 39 y.o. female with menorrhagia    Active Hospital Problems:  Active Hospital Problems    Diagnosis     Menorrhagia with regular cycle     History of 3   sections      Plan:     Diagnostic laparoscopy, hysteroscopy d&c endometrial ablation: The patient was counseled to the risks and benefits of this procedure to include infection, bleeding, damage to internal organs, bowels, bladder, blood vessels, other internal organs requiring additional surgery to repair or remove any damaged structures.  Risks and benefits of surgery are discussed with patient to include infection, bleeding, damage to internal organs, bowels, bladder, other internal organs requiring additional surgery to repair or remove any damaged structures. Possible eumenorrhea, hypomenorrhea and failure to correct bleeding.  Possible cyclic pain despite amenorrhea and need for additional surgery to include possible hysterectomy. Patient is to have complications that occur that are either not recognized or occur in the postoperative period may have a prolonged recovery period and sometimes an incomplete recovery.  The patient was given the opportunity to ask questions and her questions were answered to her satisfaction.       VTE Prophylaxis:  No VTE prophylaxis order currently exists.        Zuleima Marie, DO

## 2025-02-04 ENCOUNTER — ANESTHESIA (OUTPATIENT)
Dept: PERIOP | Facility: HOSPITAL | Age: 40
End: 2025-02-04
Payer: COMMERCIAL

## 2025-02-04 ENCOUNTER — HOSPITAL ENCOUNTER (OUTPATIENT)
Facility: HOSPITAL | Age: 40
Setting detail: HOSPITAL OUTPATIENT SURGERY
Discharge: HOME | End: 2025-02-04
Attending: OBSTETRICS & GYNECOLOGY | Admitting: OBSTETRICS & GYNECOLOGY
Payer: COMMERCIAL

## 2025-02-04 ENCOUNTER — ANESTHESIA EVENT (OUTPATIENT)
Dept: PERIOP | Facility: HOSPITAL | Age: 40
End: 2025-02-04
Payer: COMMERCIAL

## 2025-02-04 VITALS
TEMPERATURE: 97.5 F | HEIGHT: 63 IN | OXYGEN SATURATION: 96 % | RESPIRATION RATE: 16 BRPM | SYSTOLIC BLOOD PRESSURE: 124 MMHG | DIASTOLIC BLOOD PRESSURE: 80 MMHG | WEIGHT: 182.54 LBS | BODY MASS INDEX: 32.34 KG/M2 | HEART RATE: 83 BPM

## 2025-02-04 DIAGNOSIS — N73.6 PELVIC ADHESIVE DISEASE: Primary | ICD-10-CM

## 2025-02-04 DIAGNOSIS — Z98.891 HISTORY OF 3 CESAREAN SECTIONS: ICD-10-CM

## 2025-02-04 DIAGNOSIS — N92.0 MENORRHAGIA WITH REGULAR CYCLE: ICD-10-CM

## 2025-02-04 LAB
B-HCG UR QL: NEGATIVE
BASOPHILS # BLD AUTO: 0.04 10*3/MM3 (ref 0–0.2)
BASOPHILS NFR BLD AUTO: 0.9 % (ref 0–1.5)
DEPRECATED RDW RBC AUTO: 41.8 FL (ref 37–54)
ELLIPTOCYTES BLD QL SMEAR: NORMAL
EOSINOPHIL # BLD AUTO: 0.08 10*3/MM3 (ref 0–0.4)
EOSINOPHIL NFR BLD AUTO: 1.9 % (ref 0.3–6.2)
ERYTHROCYTE [DISTWIDTH] IN BLOOD BY AUTOMATED COUNT: 15.8 % (ref 12.3–15.4)
HCT VFR BLD AUTO: 37.4 % (ref 34–46.6)
HGB BLD-MCNC: 11.5 G/DL (ref 12–15.9)
IMM GRANULOCYTES # BLD AUTO: 0.01 10*3/MM3 (ref 0–0.05)
IMM GRANULOCYTES NFR BLD AUTO: 0.2 % (ref 0–0.5)
LARGE PLATELETS: NORMAL
LYMPHOCYTES # BLD AUTO: 1.2 10*3/MM3 (ref 0.7–3.1)
LYMPHOCYTES NFR BLD AUTO: 28.3 % (ref 19.6–45.3)
MCH RBC QN AUTO: 22.7 PG (ref 26.6–33)
MCHC RBC AUTO-ENTMCNC: 30.7 G/DL (ref 31.5–35.7)
MCV RBC AUTO: 73.9 FL (ref 79–97)
MICROCYTES BLD QL: NORMAL
MONOCYTES # BLD AUTO: 0.48 10*3/MM3 (ref 0.1–0.9)
MONOCYTES NFR BLD AUTO: 11.3 % (ref 5–12)
NEUTROPHILS NFR BLD AUTO: 2.43 10*3/MM3 (ref 1.7–7)
NEUTROPHILS NFR BLD AUTO: 57.4 % (ref 42.7–76)
NRBC BLD AUTO-RTO: 0 /100 WBC (ref 0–0.2)
PLATELET # BLD AUTO: 214 10*3/MM3 (ref 140–450)
PMV BLD AUTO: 13 FL (ref 6–12)
RBC # BLD AUTO: 5.06 10*6/MM3 (ref 3.77–5.28)
SMALL PLATELETS BLD QL SMEAR: ADEQUATE
WBC MORPH BLD: NORMAL
WBC NRBC COR # BLD AUTO: 4.24 10*3/MM3 (ref 3.4–10.8)

## 2025-02-04 PROCEDURE — 25010000002 PROPOFOL 10 MG/ML EMULSION: Performed by: NURSE ANESTHETIST, CERTIFIED REGISTERED

## 2025-02-04 PROCEDURE — 25010000002 MIDAZOLAM PER 1MG: Performed by: ANESTHESIOLOGY

## 2025-02-04 PROCEDURE — 25010000002 LIDOCAINE PF 2% 2 % SOLUTION: Performed by: NURSE ANESTHETIST, CERTIFIED REGISTERED

## 2025-02-04 PROCEDURE — 49320 DIAG LAPARO SEPARATE PROC: CPT

## 2025-02-04 PROCEDURE — 85025 COMPLETE CBC W/AUTO DIFF WBC: CPT | Performed by: OBSTETRICS & GYNECOLOGY

## 2025-02-04 PROCEDURE — 25010000002 LIDOCAINE 1 % SOLUTION: Performed by: OBSTETRICS & GYNECOLOGY

## 2025-02-04 PROCEDURE — 58563 HYSTEROSCOPY ABLATION: CPT | Performed by: OBSTETRICS & GYNECOLOGY

## 2025-02-04 PROCEDURE — 81025 URINE PREGNANCY TEST: CPT | Performed by: ANESTHESIOLOGY

## 2025-02-04 PROCEDURE — 25010000002 KETOROLAC TROMETHAMINE PER 15 MG: Performed by: NURSE ANESTHETIST, CERTIFIED REGISTERED

## 2025-02-04 PROCEDURE — 25010000002 FENTANYL CITRATE (PF) 50 MCG/ML SOLUTION: Performed by: NURSE ANESTHETIST, CERTIFIED REGISTERED

## 2025-02-04 PROCEDURE — 25010000002 ONDANSETRON PER 1 MG: Performed by: NURSE ANESTHETIST, CERTIFIED REGISTERED

## 2025-02-04 PROCEDURE — 25010000002 HYDROMORPHONE 1 MG/ML SOLUTION: Performed by: NURSE ANESTHETIST, CERTIFIED REGISTERED

## 2025-02-04 PROCEDURE — 25010000002 CEFAZOLIN PER 500 MG: Performed by: OBSTETRICS & GYNECOLOGY

## 2025-02-04 PROCEDURE — 25010000002 BUPIVACAINE (PF) 0.5 % SOLUTION: Performed by: OBSTETRICS & GYNECOLOGY

## 2025-02-04 PROCEDURE — 85007 BL SMEAR W/DIFF WBC COUNT: CPT | Performed by: OBSTETRICS & GYNECOLOGY

## 2025-02-04 PROCEDURE — 88305 TISSUE EXAM BY PATHOLOGIST: CPT | Performed by: OBSTETRICS & GYNECOLOGY

## 2025-02-04 PROCEDURE — 25010000002 DEXAMETHASONE PER 1 MG: Performed by: NURSE ANESTHETIST, CERTIFIED REGISTERED

## 2025-02-04 PROCEDURE — 25810000003 LACTATED RINGERS PER 1000 ML: Performed by: NURSE ANESTHETIST, CERTIFIED REGISTERED

## 2025-02-04 PROCEDURE — 49320 DIAG LAPARO SEPARATE PROC: CPT | Performed by: OBSTETRICS & GYNECOLOGY

## 2025-02-04 PROCEDURE — 25010000002 SUGAMMADEX 200 MG/2ML SOLUTION: Performed by: NURSE ANESTHETIST, CERTIFIED REGISTERED

## 2025-02-04 RX ORDER — LIDOCAINE HYDROCHLORIDE 10 MG/ML
INJECTION, SOLUTION INFILTRATION; PERINEURAL AS NEEDED
Status: DISCONTINUED | OUTPATIENT
Start: 2025-02-04 | End: 2025-02-04 | Stop reason: HOSPADM

## 2025-02-04 RX ORDER — BUPIVACAINE HYDROCHLORIDE 5 MG/ML
INJECTION, SOLUTION EPIDURAL; INTRACAUDAL AS NEEDED
Status: DISCONTINUED | OUTPATIENT
Start: 2025-02-04 | End: 2025-02-04 | Stop reason: HOSPADM

## 2025-02-04 RX ORDER — ONDANSETRON 2 MG/ML
INJECTION INTRAMUSCULAR; INTRAVENOUS AS NEEDED
Status: DISCONTINUED | OUTPATIENT
Start: 2025-02-04 | End: 2025-02-04 | Stop reason: SURG

## 2025-02-04 RX ORDER — PROMETHAZINE HYDROCHLORIDE 12.5 MG/1
25 TABLET ORAL ONCE AS NEEDED
Status: DISCONTINUED | OUTPATIENT
Start: 2025-02-04 | End: 2025-02-04 | Stop reason: HOSPADM

## 2025-02-04 RX ORDER — MEPERIDINE HYDROCHLORIDE 25 MG/ML
12.5 INJECTION INTRAMUSCULAR; INTRAVENOUS; SUBCUTANEOUS
Status: DISCONTINUED | OUTPATIENT
Start: 2025-02-04 | End: 2025-02-04 | Stop reason: HOSPADM

## 2025-02-04 RX ORDER — SODIUM CHLORIDE, SODIUM LACTATE, POTASSIUM CHLORIDE, CALCIUM CHLORIDE 600; 310; 30; 20 MG/100ML; MG/100ML; MG/100ML; MG/100ML
9 INJECTION, SOLUTION INTRAVENOUS CONTINUOUS PRN
Status: DISCONTINUED | OUTPATIENT
Start: 2025-02-04 | End: 2025-02-04 | Stop reason: HOSPADM

## 2025-02-04 RX ORDER — OXYCODONE HYDROCHLORIDE 5 MG/1
5 TABLET ORAL
Status: DISCONTINUED | OUTPATIENT
Start: 2025-02-04 | End: 2025-02-04 | Stop reason: HOSPADM

## 2025-02-04 RX ORDER — MIDAZOLAM HYDROCHLORIDE 2 MG/2ML
2 INJECTION, SOLUTION INTRAMUSCULAR; INTRAVENOUS ONCE
Status: COMPLETED | OUTPATIENT
Start: 2025-02-04 | End: 2025-02-04

## 2025-02-04 RX ORDER — ACETAMINOPHEN 500 MG
1000 TABLET ORAL ONCE
Status: COMPLETED | OUTPATIENT
Start: 2025-02-04 | End: 2025-02-04

## 2025-02-04 RX ORDER — DEXAMETHASONE SODIUM PHOSPHATE 4 MG/ML
INJECTION, SOLUTION INTRA-ARTICULAR; INTRALESIONAL; INTRAMUSCULAR; INTRAVENOUS; SOFT TISSUE AS NEEDED
Status: DISCONTINUED | OUTPATIENT
Start: 2025-02-04 | End: 2025-02-04 | Stop reason: SURG

## 2025-02-04 RX ORDER — MAGNESIUM HYDROXIDE 1200 MG/15ML
LIQUID ORAL AS NEEDED
Status: DISCONTINUED | OUTPATIENT
Start: 2025-02-04 | End: 2025-02-04 | Stop reason: HOSPADM

## 2025-02-04 RX ORDER — PROMETHAZINE HYDROCHLORIDE 25 MG/1
25 SUPPOSITORY RECTAL ONCE AS NEEDED
Status: DISCONTINUED | OUTPATIENT
Start: 2025-02-04 | End: 2025-02-04 | Stop reason: HOSPADM

## 2025-02-04 RX ORDER — KETOROLAC TROMETHAMINE 30 MG/ML
INJECTION, SOLUTION INTRAMUSCULAR; INTRAVENOUS AS NEEDED
Status: DISCONTINUED | OUTPATIENT
Start: 2025-02-04 | End: 2025-02-04 | Stop reason: SURG

## 2025-02-04 RX ORDER — IBUPROFEN 600 MG/1
600 TABLET, FILM COATED ORAL EVERY 6 HOURS PRN
Qty: 30 TABLET | Refills: 2 | Status: SHIPPED | OUTPATIENT
Start: 2025-02-04

## 2025-02-04 RX ORDER — FENTANYL CITRATE 50 UG/ML
INJECTION, SOLUTION INTRAMUSCULAR; INTRAVENOUS AS NEEDED
Status: DISCONTINUED | OUTPATIENT
Start: 2025-02-04 | End: 2025-02-04 | Stop reason: SURG

## 2025-02-04 RX ORDER — SCOPOLAMINE 1 MG/3D
1 PATCH, EXTENDED RELEASE TRANSDERMAL ONCE
Status: DISCONTINUED | OUTPATIENT
Start: 2025-02-04 | End: 2025-02-04 | Stop reason: HOSPADM

## 2025-02-04 RX ORDER — PROPOFOL 10 MG/ML
VIAL (ML) INTRAVENOUS AS NEEDED
Status: DISCONTINUED | OUTPATIENT
Start: 2025-02-04 | End: 2025-02-04 | Stop reason: SURG

## 2025-02-04 RX ORDER — ACETAMINOPHEN 325 MG/1
650 TABLET ORAL EVERY 4 HOURS PRN
Qty: 30 TABLET | Refills: 1 | Status: SHIPPED | OUTPATIENT
Start: 2025-02-04

## 2025-02-04 RX ORDER — OXYCODONE HYDROCHLORIDE 5 MG/1
5 TABLET ORAL EVERY 8 HOURS PRN
Qty: 8 TABLET | Refills: 0 | Status: SHIPPED | OUTPATIENT
Start: 2025-02-04

## 2025-02-04 RX ORDER — ONDANSETRON 2 MG/ML
4 INJECTION INTRAMUSCULAR; INTRAVENOUS ONCE AS NEEDED
Status: DISCONTINUED | OUTPATIENT
Start: 2025-02-04 | End: 2025-02-04 | Stop reason: HOSPADM

## 2025-02-04 RX ORDER — SODIUM CHLORIDE, SODIUM LACTATE, POTASSIUM CHLORIDE, CALCIUM CHLORIDE 600; 310; 30; 20 MG/100ML; MG/100ML; MG/100ML; MG/100ML
INJECTION, SOLUTION INTRAVENOUS CONTINUOUS PRN
Status: DISCONTINUED | OUTPATIENT
Start: 2025-02-04 | End: 2025-02-04 | Stop reason: SURG

## 2025-02-04 RX ORDER — LIDOCAINE HYDROCHLORIDE 20 MG/ML
INJECTION, SOLUTION EPIDURAL; INFILTRATION; INTRACAUDAL; PERINEURAL AS NEEDED
Status: DISCONTINUED | OUTPATIENT
Start: 2025-02-04 | End: 2025-02-04 | Stop reason: SURG

## 2025-02-04 RX ORDER — ONDANSETRON 4 MG/1
4 TABLET, ORALLY DISINTEGRATING ORAL ONCE AS NEEDED
Status: DISCONTINUED | OUTPATIENT
Start: 2025-02-04 | End: 2025-02-04 | Stop reason: HOSPADM

## 2025-02-04 RX ORDER — ROCURONIUM BROMIDE 10 MG/ML
INJECTION, SOLUTION INTRAVENOUS AS NEEDED
Status: DISCONTINUED | OUTPATIENT
Start: 2025-02-04 | End: 2025-02-04 | Stop reason: SURG

## 2025-02-04 RX ADMIN — FENTANYL CITRATE 50 MCG: 50 INJECTION, SOLUTION INTRAMUSCULAR; INTRAVENOUS at 10:57

## 2025-02-04 RX ADMIN — ROCURONIUM BROMIDE 50 MG: 50 INJECTION INTRAVENOUS at 10:31

## 2025-02-04 RX ADMIN — SUGAMMADEX 200 MG: 100 INJECTION, SOLUTION INTRAVENOUS at 11:57

## 2025-02-04 RX ADMIN — LIDOCAINE HYDROCHLORIDE 100 MG: 20 INJECTION, SOLUTION INTRAVENOUS at 10:31

## 2025-02-04 RX ADMIN — PROPOFOL 200 MG: 10 INJECTION, EMULSION INTRAVENOUS at 10:31

## 2025-02-04 RX ADMIN — ONDANSETRON 4 MG: 2 INJECTION INTRAMUSCULAR; INTRAVENOUS at 11:47

## 2025-02-04 RX ADMIN — OXYCODONE HYDROCHLORIDE 5 MG: 5 TABLET ORAL at 12:28

## 2025-02-04 RX ADMIN — KETOROLAC TROMETHAMINE 15 MG: 30 INJECTION, SOLUTION INTRAMUSCULAR; INTRAVENOUS at 11:47

## 2025-02-04 RX ADMIN — ONDANSETRON 4 MG: 2 INJECTION INTRAMUSCULAR; INTRAVENOUS at 12:35

## 2025-02-04 RX ADMIN — ACETAMINOPHEN 1000 MG: 500 TABLET ORAL at 09:13

## 2025-02-04 RX ADMIN — SODIUM CHLORIDE, POTASSIUM CHLORIDE, SODIUM LACTATE AND CALCIUM CHLORIDE: 600; 310; 30; 20 INJECTION, SOLUTION INTRAVENOUS at 10:25

## 2025-02-04 RX ADMIN — DEXAMETHASONE SODIUM PHOSPHATE 8 MG: 4 INJECTION, SOLUTION INTRAMUSCULAR; INTRAVENOUS at 11:27

## 2025-02-04 RX ADMIN — FENTANYL CITRATE 50 MCG: 50 INJECTION, SOLUTION INTRAMUSCULAR; INTRAVENOUS at 10:31

## 2025-02-04 RX ADMIN — HYDROMORPHONE HYDROCHLORIDE 0.5 MG: 1 INJECTION, SOLUTION INTRAMUSCULAR; INTRAVENOUS; SUBCUTANEOUS at 11:56

## 2025-02-04 RX ADMIN — MIDAZOLAM HYDROCHLORIDE 2 MG: 1 INJECTION, SOLUTION INTRAMUSCULAR; INTRAVENOUS at 10:21

## 2025-02-04 RX ADMIN — SODIUM CHLORIDE 2000 MG: 9 INJECTION, SOLUTION INTRAVENOUS at 10:25

## 2025-02-04 RX ADMIN — SCOLOPAMINE TRANSDERMAL SYSTEM 1 PATCH: 1 PATCH, EXTENDED RELEASE TRANSDERMAL at 09:13

## 2025-02-04 NOTE — ANESTHESIA POSTPROCEDURE EVALUATION
Patient: Latonia Vasquez    Procedure Summary       Date: 25 Room / Location: [facility] OR  / [facility]    Anesthesia Start: 1025 Anesthesia Stop: 1210    Procedures:       DIAGNOSTIC LAPAROSCOPY (Abdomen)      DILATATION AND CURETTAGE HYSTEROSCOPY NOVASURE ENDOMETRIAL ABLATION (Vagina) Diagnosis:       Menorrhagia with regular cycle      History of 3  sections      (Menorrhagia with regular cycle [N92.0])      (History of 3  sections [Z98.891])    Surgeons: Zuleima Marie DO Provider: Barby Ch MD    Anesthesia Type: general ASA Status: 2            Anesthesia Type: general    Vitals  Vitals Value Taken Time   /80 25 1310   Temp 36.8 °C (98.2 °F) 25 1210   Pulse 82 25 1311   Resp 18 25 1220   SpO2 96 % 25 1311   Vitals shown include unfiled device data.        Post Anesthesia Care and Evaluation    Patient location during evaluation: bedside  Patient participation: complete - patient participated  Level of consciousness: awake  Pain management: adequate    Airway patency: patent  PONV Status: none  Cardiovascular status: acceptable and stable  Respiratory status: acceptable  Hydration status: acceptable

## 2025-02-04 NOTE — OP NOTE
DIAGNOSTIC LAPAROSCOPY, DILATATION AND CURETTAGE HYSTEROSCOPY NOVASURE ENDOMETRIAL ABLATION  Procedure Report    Patient Name:  Latoina Vasquez  YOB: 1985    Date of Surgery:  2025     Indications: Menorrhagia    Pre-op Diagnosis:   Menorrhagia with regular cycle [N92.0]  History of 3  sections [Z98.891]       Post-Op Diagnosis Codes:     * Menorrhagia with regular cycle [N92.0]     * History of 3  sections [Z98.891]    Procedure/CPT® Codes:  OR LAPS ABD PRTM&OMENTUM DX W/WO SPEC BR/WA SPX [83883]  OR HYSTEROSCOPY ENDOMETRIAL ABLATION [92721]    Procedure(s):  DIAGNOSTIC LAPAROSCOPY  DILATATION AND CURETTAGE HYSTEROSCOPY NOVASURE ENDOMETRIAL ABLATION    Staff:  Surgeon(s):  Zuleima Marie DO    Assistant: Loyda Starks RN CSA    Anesthesia: General    Estimated Blood Loss: minimal    Implants:    Nothing was implanted during the procedure    Specimen:          Specimens       ID Source Type Tests Collected By Collected At Frozen?    A Endometrial Curettings Tissue TISSUE PATHOLOGY EXAM   Zuleima Marie DO 25 1122     Description: Endometrial curettings                Findings: Uterus adhered to the anterior abdominal wall.  Normal ovaries bilaterally.  Normal tubes bilaterally.  Patient is status post bilateral segmental resections of the tubes for sterilization.  Cul-de-sac is clear.  Bladder densely adhered to the lower uterine segment and anterior abdominal wall.  No evidence of endometrial implants.  Mobility of the uterus is limited due to adhesions.    Complications: None    Description of Procedure: After the appropriate consents had been obtained, the patient was taken  to the operative suite where she was placed in supine position and  underwent general endotracheal intubation.  After an adequate level of  anesthesia been obtained, the patient was placed in the low lithotomy  position and prepped and draped in usual sterile fashion.  Exam under  anesthesia  revealed an axial uterus.  No adnexal masses were appreciated.  A speculum was placed in the vagina to view the cervix and the anterior  lip of the cervix was grasped with a single-tooth tenaculum.  The Hulka  tenaculum was inserted without incident.  The speculum and single-tooth  tenaculum were removed.  A Muhammad catheter was placed with drainage of  clear urine.  Attention was now turned towards the abdominal portion of  the procedure.  A supraumbilical incision was made after the skin had been  anesthetized with half percent Marcaine.  The Veress needle was introduced  into the abdominal cavity and correct place was confirmed using water  saline test.  A pneumoperitoneum was then created using carbon dioxide  gas.  After an adequate pneumoperitoneum had been created, the Veress  needle was removed.  The laparoscope and the 5 mm trocar and sleeve was  introduced into the abdominal cavity under direct visualization.  The  trocar was removed and the laparoscope was reinserted.  The patient was  placed in steep Trendelenburg.     A second  incision was made in the left middle quadrant using landmarks of 3 cm  below the umbilicus and 8 cm to the left of the umbilicus.  The skin was  anesthetized with half percent Marcaine and an 8 mm trocar and sleeve was  introduced under direct visualization.  The trocar was removed.  The above findings were noted.     The gas was allowed to escape from the abdomen  and the instruments were removed.  The skin was reapproximated in a  subcuticular fashion using suture of 4-0 Monocryl.  Dermabond dressing was  placed.  The sponge count instrument counts were verified as correct.      DIAGNOSTIC LAPAROSCOPY, DILATATION AND CURETTAGE HYSTEROSCOPY HTA ENDOMETRIAL ABLATION  Procedure Report    Patient Name:  Latonia Vasquez  YOB: 1985    Date of Surgery:  2/4/2025     Indications: Menorrhagia    Pre-op Diagnosis:   Menorrhagia with regular cycle [N92.0]  History of 3   sections [Z98.891]       Post-Op Diagnosis Codes:     * Menorrhagia with regular cycle [N92.0]     * History of 3  sections [Z98.891]    Procedure/CPT® Codes:  IN LAPS ABD PRTM&OMENTUM DX W/WO SPEC BR/WA SPX [65587]  IN HYSTEROSCOPY ENDOMETRIAL ABLATION [48887]    Procedure(s):  DIAGNOSTIC LAPAROSCOPY  DILATATION AND CURETTAGE HYSTEROSCOPY NOVASURE ENDOMETRIAL ABLATION    Staff:  Surgeon(s):  Zuleima Marie DO    Assistant: Loyda Starks RN CSA    Anesthesia: General    Estimated Blood Loss: minimal    Implants:    Nothing was implanted during the procedure    Specimen:          Specimens       ID Source Type Tests Collected By Collected At Frozen?    A Endometrial Curettings Tissue TISSUE PATHOLOGY EXAM   Zuleima Marie DO 25 1122     Description: Endometrial curettings                Findings: Normal tubal ostia.  No polyps or fibroids were noted.    Complications: None    Description of Procedure:  A speculum was placed in the vagina to view the cervix and the anterior lip of the cervix was grasped with a single-tooth tenaculum.  The cervix was dilated to 15 mm and the 5 mm hysteroscope  which was previously attached to saline was easily introduced into the intrauterine cavity.  A proliferative pattern was noted.  Normal tubal ostia were visualized.  No polyps or fibroids were seen.  The hysteroscope was removed.  The cervix was dilated up to 18 mm.  A paracervical block was performed using 24cc of 1% Xylocaine.  The uterus was sharply curetted and the specimen was handed off the field as endometrial curettings.  The [facility] endometrial ablation machine was introduced into the uterine cavity under direct visualization.  Once we were in place and the instruments were secured and we had excellent visualization of the intrauterine cavity, the machine was turned on.  The water was heated to 87 degrees under direct visualization.  The procedure took 10 minutes to complete and then a  cooldown of 2 minutes.  Once we were finished the machine turned off and the instruments were removed.  The single-tooth tenaculum was removed.  There was some bleeding noted from the puncture sites which was contained using silver nitrate sticks.  The speculum was removed.  The patient was taken out of the lithotomy position and awakened, extubated and transferred to recovery room in stable and satisfactory condition.  The sponge counts and instrument counts were verified as correct.    Assistant: Loyda Starks RN CSA  was responsible for performing the following activities: Closing and Held/Positioned Camera and their skilled assistance was necessary for the success of this case.    Zuleima Marie,      Date: 2/4/2025  Time: 12:08 EST

## 2025-02-04 NOTE — ANESTHESIA PREPROCEDURE EVALUATION
Anesthesia Evaluation     Patient summary reviewed and Nursing notes reviewed   history of anesthetic complications:  PONV  NPO Solid Status: > 8 hours  NPO Liquid Status: > 2 hours           Airway   Mallampati: I  TM distance: >3 FB  Neck ROM: full  No difficulty expected  Dental - normal exam     Pulmonary - negative pulmonary ROS and normal exam    breath sounds clear to auscultation  Cardiovascular - normal exam  Exercise tolerance: good (4-7 METS)    Rhythm: regular  Rate: normal    (+) hyperlipidemia      Neuro/Psych  (+) numbness, psychiatric history Anxiety  GI/Hepatic/Renal/Endo    (+) thyroid problem thyroid nodules    Musculoskeletal (-) negative ROS    Abdominal   (+) obese   Substance History - negative use     OB/GYN negative ob/gyn ROS         Other - negative ROS       ROS/Med Hx Other: PAT Nursing Notes unavailable.                     Anesthesia Plan    ASA 2     general     (Patient understands anesthesia not responsible for dental damage.)  intravenous induction     Anesthetic plan, risks, benefits, and alternatives have been provided, discussed and informed consent has been obtained with: patient and spouse/significant other.    Use of blood products discussed with patient .    Plan discussed with CRNA.        CODE STATUS:

## 2025-02-14 ENCOUNTER — TELEPHONE (OUTPATIENT)
Dept: OBSTETRICS AND GYNECOLOGY | Facility: CLINIC | Age: 40
End: 2025-02-14
Payer: COMMERCIAL

## 2025-02-14 NOTE — TELEPHONE ENCOUNTER
Hub staff attempted to follow warm transfer process and was unsuccessful     Caller: Latonia Vasquez    Relationship to patient: Self    Best call back number: [phone]      Patient is needing: PT WOULD LIKE TO SPEAK WITH A NURSE. PT ASKING IF SHE CAN TAKE A PICTURE OF HER POSSIBLY INFECTED SITE AND SEND IT THROUGH [employer]. PT STATES SHE HAS BETADINE SHE CAN START PUTTING ON AS WELL AS ANTIBIOTIC OINTMENT.     OK TO LEAVE VM WITH PT

## 2025-02-14 NOTE — TELEPHONE ENCOUNTER
Patient called and states some redness but recent took off sterri-strip and could be irritation from that. She has been keeping triple antibiotic ointment on it and notes some clear drainage from that. Advised patient to keep area clean and dry and cover with a clean bandage. She will monitor and call if worsening redness, warmth/hardness to area, or cloudy drainage.

## 2025-03-06 NOTE — PROGRESS NOTES
Post Operative Visit        Chief Complaint   Patient presents with    Post-op Follow-up     HPI:   Pain:  yes cramping and pain around belly button  Vaginal bleeding:  yes  Vaginal discharge:  yes  Fever/chills:  no  Good appetite:  yes  Normal bladder function:  yes  Normal bowel function:  yes  Hot flashes: yes at night     PHYSICAL EXAM:  /84   Pulse 78   Wt 83 kg (183 lb)   LMP 03/11/2025 (Exact Date) Comment: ablation as well  BMI 32.42 kg/m²    Chaperone present during breast and/or pelvic exam if performed.  General- NAD, alert and oriented, appropriate  Psych- Normal mood, good memory    Abdomen- Soft, non distended, non tender, no masses  Incision/s-  Clean, dry, intact, healing well     Results of surgery reviewed with patient.  Patient with extensive pelvic adhesive disease.    CLINICAL PHOTOGRAPHS - SCAN - DX LAP PROCEDURE PHOTOS, LEISA, 02/04/2025 (02/04/2025)    Tissue Pathology Exam (02/04/2025 11:22)    Op Note by Zuleima Marie DO (02/04/2025 10:57)    ASSESSMENT and PLAN:  Post-operative exam    Diagnoses and all orders for this visit:    1. Postoperative follow-up (Primary)    2. Encounter for screening mammogram for breast cancer  -     Mammo Screening Digital Tomosynthesis Bilateral With CAD; Future        Operative report, surgical findings and any pathology/photos reviewed.  All questions answered.     Counseling:   Ok to resume normal activities  Ok to resume intercourse  Return to school/work without limitations      Follow Up:  Return in about 8 weeks (around 5/7/2025) for Annual physical.            Zuleima Marie DO  03/12/2025    [facility]  [facility] [place] [address]  Dept: [phone]  Dept Fax: [phone]  Loc: [phone]  Loc Fax: [phone]

## 2025-03-12 ENCOUNTER — OFFICE VISIT (OUTPATIENT)
Dept: OBSTETRICS AND GYNECOLOGY | Facility: CLINIC | Age: 40
End: 2025-03-12
Payer: COMMERCIAL

## 2025-03-12 VITALS
SYSTOLIC BLOOD PRESSURE: 126 MMHG | HEART RATE: 78 BPM | WEIGHT: 183 LBS | DIASTOLIC BLOOD PRESSURE: 84 MMHG | BODY MASS INDEX: 32.42 KG/M2

## 2025-03-12 DIAGNOSIS — Z12.31 ENCOUNTER FOR SCREENING MAMMOGRAM FOR BREAST CANCER: ICD-10-CM

## 2025-03-12 DIAGNOSIS — Z09 POSTOPERATIVE FOLLOW-UP: Primary | ICD-10-CM

## 2025-03-12 PROCEDURE — 99024 POSTOP FOLLOW-UP VISIT: CPT | Performed by: OBSTETRICS & GYNECOLOGY

## 2025-03-12 RX ORDER — ERGOCALCIFEROL 1.25 MG/1
1 CAPSULE, LIQUID FILLED ORAL WEEKLY
COMMUNITY
Start: 2025-02-12 | End: 2025-03-12

## 2025-03-26 ENCOUNTER — TELEPHONE (OUTPATIENT)
Dept: FAMILY MEDICINE CLINIC | Age: 40
End: 2025-03-26
Payer: COMMERCIAL

## 2025-03-26 NOTE — TELEPHONE ENCOUNTER
Left message regarding overdue fasting labs ordered 12/11/24 with expected date of 3/11/25. 1st attempt

## 2025-03-31 ENCOUNTER — HOSPITAL ENCOUNTER (OUTPATIENT)
Dept: MAMMOGRAPHY | Facility: HOSPITAL | Age: 40
Discharge: HOME | End: 2025-03-31
Admitting: OBSTETRICS & GYNECOLOGY
Payer: COMMERCIAL

## 2025-03-31 DIAGNOSIS — Z12.31 ENCOUNTER FOR SCREENING MAMMOGRAM FOR BREAST CANCER: ICD-10-CM

## 2025-03-31 PROCEDURE — 77063 BREAST TOMOSYNTHESIS BI: CPT

## 2025-03-31 PROCEDURE — 77067 SCR MAMMO BI INCL CAD: CPT

## 2025-04-19 DIAGNOSIS — G25.81 RESTLESS LEG SYNDROME: ICD-10-CM

## 2025-04-21 NOTE — TELEPHONE ENCOUNTER
Why are two of the 2 mg tablets daily being requested when I previously sent 4 mg tablet at bedtime?

## 2025-04-22 DIAGNOSIS — G25.81 RESTLESS LEG SYNDROME: ICD-10-CM

## 2025-04-22 RX ORDER — ROPINIROLE 4 MG/1
4 TABLET, FILM COATED, EXTENDED RELEASE ORAL NIGHTLY
Qty: 90 TABLET | Refills: 1 | Status: SHIPPED | OUTPATIENT
Start: 2025-04-22

## 2025-04-22 RX ORDER — ROPINIROLE HYDROCHLORIDE 2 MG/1
4 TABLET, FILM COATED, EXTENDED RELEASE ORAL
Qty: 180 TABLET | Refills: 0 | OUTPATIENT
Start: 2025-04-22

## 2025-04-22 NOTE — TELEPHONE ENCOUNTER
Spoke to pharmacy and she said that the 4mg was on backorder, so they substituted the 2mg, but she is ordering the 4mg and it should be there by tomorrow.

## 2025-05-30 ENCOUNTER — OFFICE VISIT (OUTPATIENT)
Dept: FAMILY MEDICINE CLINIC | Age: 40
End: 2025-05-30
Payer: COMMERCIAL

## 2025-05-30 VITALS
OXYGEN SATURATION: 99 % | HEIGHT: 63 IN | SYSTOLIC BLOOD PRESSURE: 134 MMHG | BODY MASS INDEX: 33.13 KG/M2 | TEMPERATURE: 97.8 F | DIASTOLIC BLOOD PRESSURE: 72 MMHG | WEIGHT: 187 LBS | HEART RATE: 89 BPM

## 2025-05-30 DIAGNOSIS — G25.81 RESTLESS LEG SYNDROME: ICD-10-CM

## 2025-05-30 DIAGNOSIS — D50.9 IRON DEFICIENCY ANEMIA, UNSPECIFIED IRON DEFICIENCY ANEMIA TYPE: Primary | ICD-10-CM

## 2025-05-30 DIAGNOSIS — Z13.29 THYROID DISORDER SCREEN: ICD-10-CM

## 2025-05-30 DIAGNOSIS — E01.0 THYROMEGALY: ICD-10-CM

## 2025-05-30 DIAGNOSIS — M25.50 ARTHRALGIA, UNSPECIFIED JOINT: ICD-10-CM

## 2025-05-30 RX ORDER — MELOXICAM 7.5 MG/1
7.5 TABLET ORAL DAILY
Qty: 30 TABLET | Refills: 2 | Status: SHIPPED | OUTPATIENT
Start: 2025-05-30 | End: 2025-06-29

## 2025-05-30 RX ORDER — ROPINIROLE HYDROCHLORIDE 4 MG/1
4 TABLET, FILM COATED, EXTENDED RELEASE ORAL NIGHTLY
Qty: 90 TABLET | Refills: 1 | Status: SHIPPED | OUTPATIENT
Start: 2025-05-30

## 2025-05-30 NOTE — PROGRESS NOTES
Chief Complaint  Anxiety (6 month follow up ), Restless Legs Syndrome, Hyperlipidemia, and Vitamin D Deficiency    Subjective          Latonia Vasquez presents to [facility]     Patient is a 39-year-old female who is here today to follow-up regarding anxiety.  Patient states symptoms are currently improved on Wellbutrin 100 mg once per day.  Does not feel the need to or want to take it twice per day.  She is aware that it is a 12-hour and a 24-hour release tablet.  Denies side effects and requests refills.  Restless leg symptoms are improved currently on ropinirole 4 mg at bedtime.  Patient states the pharmacy had to get it from a different  and is giving her 2 of the 2 mg tablets to equal 4 mg at bedtime.  She is advised that she was borderline anemic on February 4 when she saw gynecology.  She did have an endometrial ablation at that time but has continued to have menstrual cycles but they are lighter and shorter.    There are some pending lab orders waiting to be completed and patient is aware.    Patient currently is getting semaglutide with vitamin B12 compounded from a [employer] [facility] to help with weight loss.  She has reports she has no family history of medullary thyroid cancer or personal history of pancreatitis.  She is aware of the risk and benefits of the medication and denies any problems currently.  She plans to take it short-term.  She is aware that lifestyle measures including diet and exercise are essential in addition to taking weight loss medication to prevent rapid regaining of weight once medication ended.    Regarding thyromegaly she did see ENT in 2023 and was told no further follow-up was necessary.    Request refill meloxicam 7.5 mg that she will take daily as needed but not on a regular basis for intermittent aches and pains.  She denies diffuse arthralgia, rash or any other symptoms.       Objective   Vital Signs:   Vitals:    05/30/25 0755   BP: 134/72  "  BP Location: Left arm   Patient Position: Sitting   Cuff Size: Large Adult   Pulse: 89   Temp: 97.8 °F (36.6 °C)   TempSrc: Temporal   SpO2: 99%   Weight: 84.8 kg (187 lb)   Height: 160 cm (63\")       Wt Readings from Last 3 Encounters:   05/30/25 84.8 kg (187 lb)   03/12/25 83 kg (183 lb)   02/04/25 82.8 kg (182 lb 8.7 oz)      BP Readings from Last 3 Encounters:   05/30/25 134/72   03/12/25 126/84   02/04/25 124/80       Body mass index is 33.13 kg/m².             Physical Exam  Vitals reviewed.   Constitutional:       General: She is not in acute distress.     Appearance: Normal appearance. She is well-developed. She is obese.   Neck:      Thyroid: Thyromegaly present.   Cardiovascular:      Rate and Rhythm: Normal rate and regular rhythm.      Pulses:           Posterior tibial pulses are 2+ on the right side and 2+ on the left side.      Heart sounds: Normal heart sounds.   Pulmonary:      Effort: Pulmonary effort is normal.      Breath sounds: Normal breath sounds.   Musculoskeletal:      Right lower leg: No edema.      Left lower leg: No edema.   Skin:     General: Skin is warm and dry.   Neurological:      General: No focal deficit present.      Mental Status: She is alert.   Psychiatric:         Attention and Perception: Attention normal.         Mood and Affect: Mood and affect normal.         Behavior: Behavior normal.           Current Outpatient Medications:     buPROPion SR (Wellbutrin SR) 100 MG 12 hr tablet, Take 1 tablet by mouth 2 (Two) Times a Day., Disp: 180 tablet, Rfl: 1    rOPINIRole XL (REQUIP XL) 4 MG 24 hr tablet, Take 1 tablet by mouth Every Night., Disp: 90 tablet, Rfl: 1    SEMAGLUTIDE-WEIGHT MANAGEMENT SC, Inject  under the skin into the appropriate area as directed., Disp: , Rfl:     meloxicam (MOBIC) 7.5 MG tablet, Take 1 tablet by mouth Daily for 30 days., Disp: 30 tablet, Rfl: 2   Past Medical History:   Diagnosis Date    Acne     Anxiety     PONV (postoperative nausea and " "vomiting)     RLS (restless legs syndrome)     Thyroid nodule     Thyromegaly 5/30/2025     Allergies   Allergen Reactions    Buspirone Other (See Comments)     Drowsy \"felt weird\"    Lexapro [Escitalopram] Other (See Comments)     Increased appetite and weight               Result Review :     Common labs          2/4/2025    08:36   Common Labs   WBC 4.24    Hemoglobin 11.5    Hematocrit 37.4    Platelets 214         Mammo Screening Digital Tomosynthesis Bilateral With CAD  Result Date: 3/31/2025  No mammographic evidence of malignancy.  Recommend annual screening mammography.  BI-RADS ASSESSMENT: Category 2: Benign  Note:  It has been reported that there is approximately a 15% false negative rate in mammography.  Therefore, management of a palpable abnormality should not be deferred because of a negative mammogram.  3/31/2025 2:33 PM by David Hollis MD on Workstation: [employer]               Social History     Tobacco Use   Smoking Status Never   Smokeless Tobacco Never           Assessment and Plan    Diagnoses and all orders for this visit:    1. Iron deficiency anemia, unspecified iron deficiency anemia type (Primary)  Assessment & Plan:  Further recommendations pending lab results.    Orders:  -     CBC w AUTO Differential; Future  -     Iron and TIBC; Future    2. Thyroid disorder screen  -     TSH Rfx On Abnormal To Free T4; Future    3. Restless leg syndrome  -     rOPINIRole XL (REQUIP XL) 4 MG 24 hr tablet; Take 1 tablet by mouth Every Night.  Dispense: 90 tablet; Refill: 1    4. Arthralgia, unspecified joint  -     meloxicam (MOBIC) 7.5 MG tablet; Take 1 tablet by mouth Daily for 30 days.  Dispense: 30 tablet; Refill: 2    5. Thyromegaly  Assessment & Plan:  [facility] for review.  Thyroid nodule was noted on their ultrasound approximately 2 x 2 cm right and 1.3 x 1.5 cm on the left.  Fine-needle aspirate was benign.  This office had done an ultrasound 2 or " 3 months prior that noted the right thyroid nodule.  TSH is pending.  Will reach out to patient and ask if she would like to have another thyroid ultrasound to compare to 2023 results.          Follow Up    Return in about 6 months (around 11/30/2025), or if symptoms worsen or fail to improve.  Patient was given instructions and counseling regarding her condition or for health maintenance advice. Please see specific information pulled into the AVS if appropriate.

## 2025-05-30 NOTE — Clinical Note
Viewed to ENT visit notes from 2023.  Fine-needle aspirate negative.  She had 2 thyroid ultrasounds in 2023.  Please ask if she would like to get a thyroid ultrasound this year to compare to previous results.

## 2025-06-06 ENCOUNTER — TELEPHONE (OUTPATIENT)
Dept: FAMILY MEDICINE CLINIC | Age: 40
End: 2025-06-06

## 2025-06-06 NOTE — TELEPHONE ENCOUNTER
please request 2023 visit note with [facility] dr mcrae or aline [phone] option 8  left message on vm to fax records.

## 2025-06-10 NOTE — ASSESSMENT & PLAN NOTE
Received the 2 visit notes from [facility] in 2023 for review.  Thyroid nodule was noted on their ultrasound approximately 2 x 2 cm right and 1.3 x 1.5 cm on the left.  Fine-needle aspirate was benign.  This office had done an ultrasound 2 or 3 months prior that noted the right thyroid nodule.  TSH is pending.  Will reach out to patient and ask if she would like to have another thyroid ultrasound to compare to 2023 results.

## 2025-06-11 NOTE — PROGRESS NOTES
She states that she wants to check her thyroid levels first and then decide if she wants to do the US.  She will be doing the labs you ordered soon

## 2025-06-20 ENCOUNTER — TELEPHONE (OUTPATIENT)
Dept: FAMILY MEDICINE CLINIC | Age: 40
End: 2025-06-20
Payer: COMMERCIAL

## 2025-08-17 DIAGNOSIS — G25.81 RESTLESS LEG SYNDROME: ICD-10-CM

## 2025-08-18 RX ORDER — ROPINIROLE HYDROCHLORIDE 2 MG/1
4 TABLET, FILM COATED, EXTENDED RELEASE ORAL
Qty: 60 TABLET | Refills: 1 | OUTPATIENT
Start: 2025-08-18

## (undated) DEVICE — VAGINAL PREP TRAY: Brand: MEDLINE INDUSTRIES, INC.

## (undated) DEVICE — SYR LL TP 10ML STRL

## (undated) DEVICE — PROB ABL ENDOMTRL NOVASURE/G4 W/SURESND

## (undated) DEVICE — PCH SURG INST LAP 2 LF

## (undated) DEVICE — SLV SCD KN/LEN ADJ EXPRSS BLENDED MD 1P/U

## (undated) DEVICE — 1LYRTR 16FR10ML100%SIL UMS SNP: Brand: MEDLINE INDUSTRIES, INC.

## (undated) DEVICE — SHEET,DRAPE,70X85,STERILE: Brand: MEDLINE

## (undated) DEVICE — SUT MNCRYL PLS ANTIB UD 4/0 PS2 18IN

## (undated) DEVICE — KIT,ANTI FOG,W/SPONGE & FLUID,SOFT PACK: Brand: MEDLINE

## (undated) DEVICE — DUAL LUMEN STOMACH TUBE: Brand: SALEM SUMP

## (undated) DEVICE — 1000ML,PRESSURE INFUSER W/STOPCOCK: Brand: MEDLINE

## (undated) DEVICE — TROCAR: Brand: KII® SLEEVE

## (undated) DEVICE — LITHOTOMY-SLINGS: Brand: MEDLINE INDUSTRIES, INC.

## (undated) DEVICE — ADHS SKIN SURG TISS VISC PREMIERPRO EXOFIN HI/VISC 1ML

## (undated) DEVICE — LAPAROSCOPIC TROCAR SLEEVE/SINGLE USE: Brand: KII® OPTICAL ACCESS SYSTEM

## (undated) DEVICE — ST THRM ABLATOR HTA PROCERVA GENESYS

## (undated) DEVICE — THE STERILE LIGHT HANDLE COVER IS USED WITH STERIS SURGICAL LIGHTING AND VISUALIZATION SYSTEMS.

## (undated) DEVICE — TROC ACC ABD KII BLADLES SEAL THRD 11X100MM

## (undated) DEVICE — TOWEL,OR,DSP,ST,BLUE,STD,4/PK,20PK/CS: Brand: MEDLINE

## (undated) DEVICE — LITHOTOMY-YELLOW FINS: Brand: MEDLINE INDUSTRIES, INC.

## (undated) DEVICE — GLV SURG BIOGEL LTX PF 6 1/2

## (undated) DEVICE — HYPODERMIC SAFETY NEEDLE: Brand: MONOJECT

## (undated) DEVICE — 40585 XL ADVANCED TRENDELENBURG POSITIONING KIT: Brand: 40585 XL ADVANCED TRENDELENBURG POSITIONING KIT

## (undated) DEVICE — CATH URETH INTRMIT ALLPURP LTX 16F RED

## (undated) DEVICE — INSUFFLATION NEEDLE TO ESTABLISH PNEUMOPERITONEUM.: Brand: INSUFFLATION NEEDLE

## (undated) DEVICE — GOWN,REINFORCE,POLY,SIRUS,BREATH SLV,XLG: Brand: MEDLINE

## (undated) DEVICE — INTENDED FOR TISSUE SEPARATION, AND OTHER PROCEDURES THAT REQUIRE A SHARP SURGICAL BLADE TO PUNCTURE OR CUT.: Brand: BARD-PARKER ® CARBON RIB-BACK BLADES

## (undated) DEVICE — ENDOPATH XCEL WITH OPTIVIEW TECHNOLOGY BLADELESS TROCARS WITH STABILITY SLEEVES: Brand: ENDOPATH XCEL OPTIVIEW

## (undated) DEVICE — NDL HYPO PRECISIONGLIDE REG 22G 1 1/2

## (undated) DEVICE — SOL NACL 0.9PCT 1000ML

## (undated) DEVICE — PAD GRND REM POLYHESIVE A/ DISP